# Patient Record
Sex: MALE | Race: WHITE
[De-identification: names, ages, dates, MRNs, and addresses within clinical notes are randomized per-mention and may not be internally consistent; named-entity substitution may affect disease eponyms.]

---

## 2018-03-23 ENCOUNTER — HOSPITAL ENCOUNTER (OUTPATIENT)
Age: 73
End: 2018-03-23
Payer: MEDICARE

## 2018-03-23 DIAGNOSIS — I10: Primary | ICD-10-CM

## 2018-03-23 DIAGNOSIS — E55.9: ICD-10-CM

## 2018-03-23 LAB
ALANINE AMINOTRANSFER ALT/SGPT: 20 U/L (ref 16–61)
ALBUMIN SERPL-MCNC: 3.1 G/DL (ref 3.2–5)
ALKALINE PHOSPHATASE: 73 U/L (ref 45–117)
ANION GAP: 7 (ref 5–15)
AST(SGOT): 24 U/L (ref 15–37)
BUN SERPL-MCNC: 19 MG/DL (ref 7–18)
BUN/CREAT RATIO: 18.4 RATIO (ref 10–20)
CALCIUM SERPL-MCNC: 8.4 MG/DL (ref 8.5–10.1)
CARBON DIOXIDE: 27 MMOL/L (ref 21–32)
CHLORIDE: 102 MMOL/L (ref 98–107)
DEPRECATED RDW RBC: 53.6 FL (ref 35.1–43.9)
DIFFERENTIAL INDICATED: (no result)
ERYTHROCYTE [DISTWIDTH] IN BLOOD: 15.2 % (ref 11.6–14.6)
EST GLOM FILT RATE - AFR AMER: 91 ML/MIN (ref 60–?)
GLOBULIN: 4.5 G/DL (ref 2.2–4.2)
GLUCOSE: 83 MG/DL (ref 74–106)
HCT VFR BLD AUTO: 43 % (ref 40–54)
HEMOGLOBIN: 13.2 G/DL (ref 13–16.5)
HGB BLD-MCNC: 13.2 G/DL (ref 13–16.5)
IMMATURE GRANULOCYTES COUNT: 0.04 X10^3/UL (ref 0–0)
MCV RBC: 96.6 FL (ref 80–94)
MEAN CORP HGB CONC: 30.7 G/GL (ref 32–36)
MEAN PLATELET VOL.: 11 FL (ref 6.2–12)
PLATELET # BLD: 175 K/MM3 (ref 150–450)
PLATELET COUNT: 175 K/MM3 (ref 150–450)
POSITIVE COUNT: NO
POSITIVE DIFFERENTIAL: NO
POSITIVE MORPHOLOGY: NO
POTASSIUM: 4.7 MMOL/L (ref 3.5–5.1)
RBC # BLD AUTO: 4.45 M/MM3 (ref 4.6–6.2)
RBC DISTRIBUTION WIDTH CV: 15.2 % (ref 11.6–14.6)
RBC DISTRIBUTION WIDTH SD: 53.6 FL (ref 35.1–43.9)
VITAMIN D,25 HYDROXY: 31.1 NG/ML (ref 29.95–100.01)
WBC # BLD AUTO: 9.6 K/MM3 (ref 4.4–11)
WHITE BLOOD COUNT: 9.6 K/MM3 (ref 4.4–11)

## 2018-03-23 PROCEDURE — 36415 COLL VENOUS BLD VENIPUNCTURE: CPT

## 2018-03-23 PROCEDURE — 85025 COMPLETE CBC W/AUTO DIFF WBC: CPT

## 2018-03-23 PROCEDURE — 82306 VITAMIN D 25 HYDROXY: CPT

## 2018-03-23 PROCEDURE — 84443 ASSAY THYROID STIM HORMONE: CPT

## 2018-03-23 PROCEDURE — 80053 COMPREHEN METABOLIC PANEL: CPT

## 2018-04-11 ENCOUNTER — HOSPITAL ENCOUNTER (INPATIENT)
Dept: HOSPITAL 100 - ED | Age: 73
LOS: 3 days | Discharge: NURSING, REHAB OR LONG-TERM CARE | DRG: 190 | End: 2018-04-14
Payer: MEDICARE

## 2018-04-11 VITALS
OXYGEN SATURATION: 94 % | HEART RATE: 61 BPM | DIASTOLIC BLOOD PRESSURE: 69 MMHG | SYSTOLIC BLOOD PRESSURE: 137 MMHG | RESPIRATION RATE: 20 BRPM

## 2018-04-11 VITALS
OXYGEN SATURATION: 94 % | DIASTOLIC BLOOD PRESSURE: 69 MMHG | HEART RATE: 61 BPM | SYSTOLIC BLOOD PRESSURE: 137 MMHG | RESPIRATION RATE: 20 BRPM

## 2018-04-11 VITALS — HEART RATE: 73 BPM | OXYGEN SATURATION: 93 % | RESPIRATION RATE: 26 BRPM

## 2018-04-11 VITALS
SYSTOLIC BLOOD PRESSURE: 134 MMHG | HEART RATE: 74 BPM | TEMPERATURE: 100.2 F | RESPIRATION RATE: 20 BRPM | DIASTOLIC BLOOD PRESSURE: 84 MMHG | OXYGEN SATURATION: 86 %

## 2018-04-11 VITALS — BODY MASS INDEX: 40.1 KG/M2 | BODY MASS INDEX: 38.5 KG/M2 | BODY MASS INDEX: 40 KG/M2

## 2018-04-11 VITALS
DIASTOLIC BLOOD PRESSURE: 62 MMHG | TEMPERATURE: 98.78 F | HEART RATE: 64 BPM | SYSTOLIC BLOOD PRESSURE: 129 MMHG | RESPIRATION RATE: 18 BRPM | OXYGEN SATURATION: 94 %

## 2018-04-11 VITALS — RESPIRATION RATE: 22 BRPM | HEART RATE: 78 BPM | OXYGEN SATURATION: 90 %

## 2018-04-11 VITALS — HEART RATE: 78 BPM | DIASTOLIC BLOOD PRESSURE: 57 MMHG | SYSTOLIC BLOOD PRESSURE: 123 MMHG

## 2018-04-11 VITALS — OXYGEN SATURATION: 93 %

## 2018-04-11 DIAGNOSIS — I42.9: ICD-10-CM

## 2018-04-11 DIAGNOSIS — E78.5: ICD-10-CM

## 2018-04-11 DIAGNOSIS — F17.200: ICD-10-CM

## 2018-04-11 DIAGNOSIS — E66.01: ICD-10-CM

## 2018-04-11 DIAGNOSIS — I10: ICD-10-CM

## 2018-04-11 DIAGNOSIS — C18.9: ICD-10-CM

## 2018-04-11 DIAGNOSIS — K21.9: ICD-10-CM

## 2018-04-11 DIAGNOSIS — J44.1: Primary | ICD-10-CM

## 2018-04-11 DIAGNOSIS — J20.9: ICD-10-CM

## 2018-04-11 DIAGNOSIS — I48.0: ICD-10-CM

## 2018-04-11 DIAGNOSIS — I25.10: ICD-10-CM

## 2018-04-11 DIAGNOSIS — J96.01: ICD-10-CM

## 2018-04-11 DIAGNOSIS — J44.0: ICD-10-CM

## 2018-04-11 DIAGNOSIS — G47.33: ICD-10-CM

## 2018-04-11 DIAGNOSIS — Z99.81: ICD-10-CM

## 2018-04-11 LAB
ANION GAP: 8 (ref 5–15)
BUN SERPL-MCNC: 19 MG/DL (ref 7–18)
BUN/CREAT RATIO: 18.8 RATIO (ref 10–20)
CALCIUM SERPL-MCNC: 8.6 MG/DL (ref 8.5–10.1)
CARBON DIOXIDE: 30 MMOL/L (ref 21–32)
CHLORIDE: 93 MMOL/L (ref 98–107)
DEPRECATED RDW RBC: 52.1 FL (ref 35.1–43.9)
DIFFERENTIAL INDICATED: (no result)
ERYTHROCYTE [DISTWIDTH] IN BLOOD: 15.3 % (ref 11.6–14.6)
EST GLOM FILT RATE - AFR AMER: 94 ML/MIN (ref 60–?)
ESTIMATED CREATININE CLEARANCE: 78 ML/MIN
GLUCOSE: 91 MG/DL (ref 74–106)
HCT VFR BLD AUTO: 40.7 % (ref 40–54)
HEMOGLOBIN: 13.1 G/DL (ref 13–16.5)
HGB BLD-MCNC: 13.1 G/DL (ref 13–16.5)
IMMATURE GRANULOCYTES COUNT: 0.01 X10^3/UL (ref 0–0)
MCV RBC: 91.9 FL (ref 80–94)
MEAN CORP HGB CONC: 32.2 G/GL (ref 32–36)
MEAN PLATELET VOL.: 10.4 FL (ref 6.2–12)
PLATELET # BLD: 123 K/MM3 (ref 150–450)
PLATELET COUNT: 123 K/MM3 (ref 150–450)
POSITIVE COUNT: NO
POSITIVE DIFFERENTIAL: NO
POSITIVE MORPHOLOGY: NO
POTASSIUM: 3.9 MMOL/L (ref 3.5–5.1)
PROTHROMBIN TIME (PROTIME)PT.: 16.9 SECONDS (ref 11.7–14.9)
RBC # BLD AUTO: 4.43 M/MM3 (ref 4.6–6.2)
RBC DISTRIBUTION WIDTH CV: 15.3 % (ref 11.6–14.6)
RBC DISTRIBUTION WIDTH SD: 52.1 FL (ref 35.1–43.9)
WBC # BLD AUTO: 6.8 K/MM3 (ref 4.4–11)
WHITE BLOOD COUNT: 6.8 K/MM3 (ref 4.4–11)

## 2018-04-11 PROCEDURE — 36415 COLL VENOUS BLD VENIPUNCTURE: CPT

## 2018-04-11 PROCEDURE — 87804 INFLUENZA ASSAY W/OPTIC: CPT

## 2018-04-11 PROCEDURE — 87040 BLOOD CULTURE FOR BACTERIA: CPT

## 2018-04-11 PROCEDURE — 85025 COMPLETE CBC W/AUTO DIFF WBC: CPT

## 2018-04-11 PROCEDURE — 97162 PT EVAL MOD COMPLEX 30 MIN: CPT

## 2018-04-11 PROCEDURE — 71045 X-RAY EXAM CHEST 1 VIEW: CPT

## 2018-04-11 PROCEDURE — 87070 CULTURE OTHR SPECIMN AEROBIC: CPT

## 2018-04-11 PROCEDURE — 87449 NOS EACH ORGANISM AG IA: CPT

## 2018-04-11 PROCEDURE — 99285 EMERGENCY DEPT VISIT HI MDM: CPT

## 2018-04-11 PROCEDURE — 83605 ASSAY OF LACTIC ACID: CPT

## 2018-04-11 PROCEDURE — A4216 STERILE WATER/SALINE, 10 ML: HCPCS

## 2018-04-11 PROCEDURE — 80048 BASIC METABOLIC PNL TOTAL CA: CPT

## 2018-04-11 PROCEDURE — 80162 ASSAY OF DIGOXIN TOTAL: CPT

## 2018-04-11 PROCEDURE — 85027 COMPLETE CBC AUTOMATED: CPT

## 2018-04-11 PROCEDURE — 83735 ASSAY OF MAGNESIUM: CPT

## 2018-04-11 PROCEDURE — 85610 PROTHROMBIN TIME: CPT

## 2018-04-11 PROCEDURE — 97530 THERAPEUTIC ACTIVITIES: CPT

## 2018-04-11 PROCEDURE — 97802 MEDICAL NUTRITION INDIV IN: CPT

## 2018-04-11 PROCEDURE — 94640 AIRWAY INHALATION TREATMENT: CPT

## 2018-04-11 PROCEDURE — 84484 ASSAY OF TROPONIN QUANT: CPT

## 2018-04-11 PROCEDURE — 97166 OT EVAL MOD COMPLEX 45 MIN: CPT

## 2018-04-11 PROCEDURE — 87205 SMEAR GRAM STAIN: CPT

## 2018-04-11 PROCEDURE — 87077 CULTURE AEROBIC IDENTIFY: CPT

## 2018-04-11 PROCEDURE — 93005 ELECTROCARDIOGRAM TRACING: CPT

## 2018-04-11 RX ADMIN — SODIUM CHLORIDE 150 ML: 9 INJECTION, SOLUTION INTRAVENOUS at 16:16

## 2018-04-12 VITALS
HEART RATE: 60 BPM | SYSTOLIC BLOOD PRESSURE: 107 MMHG | RESPIRATION RATE: 18 BRPM | TEMPERATURE: 97.52 F | OXYGEN SATURATION: 93 % | DIASTOLIC BLOOD PRESSURE: 61 MMHG

## 2018-04-12 VITALS — RESPIRATION RATE: 20 BRPM | HEART RATE: 80 BPM

## 2018-04-12 VITALS
RESPIRATION RATE: 20 BRPM | TEMPERATURE: 97.88 F | HEART RATE: 53 BPM | OXYGEN SATURATION: 94 % | SYSTOLIC BLOOD PRESSURE: 102 MMHG | DIASTOLIC BLOOD PRESSURE: 56 MMHG

## 2018-04-12 VITALS
HEART RATE: 53 BPM | OXYGEN SATURATION: 94 % | RESPIRATION RATE: 18 BRPM | DIASTOLIC BLOOD PRESSURE: 64 MMHG | TEMPERATURE: 98.24 F | SYSTOLIC BLOOD PRESSURE: 122 MMHG

## 2018-04-12 VITALS
TEMPERATURE: 98.2 F | SYSTOLIC BLOOD PRESSURE: 103 MMHG | OXYGEN SATURATION: 94 % | HEART RATE: 64 BPM | RESPIRATION RATE: 16 BRPM | DIASTOLIC BLOOD PRESSURE: 70 MMHG

## 2018-04-12 VITALS — HEART RATE: 57 BPM | RESPIRATION RATE: 17 BRPM | OXYGEN SATURATION: 91 %

## 2018-04-12 VITALS — RESPIRATION RATE: 18 BRPM | OXYGEN SATURATION: 92 % | HEART RATE: 55 BPM

## 2018-04-12 VITALS — RESPIRATION RATE: 22 BRPM | HEART RATE: 80 BPM

## 2018-04-12 VITALS — HEART RATE: 60 BPM

## 2018-04-12 VITALS — OXYGEN SATURATION: 93 %

## 2018-04-12 VITALS — RESPIRATION RATE: 18 BRPM | HEART RATE: 82 BPM

## 2018-04-12 VITALS — HEART RATE: 64 BPM

## 2018-04-12 LAB
ANION GAP: 7 (ref 5–15)
BUN SERPL-MCNC: 18 MG/DL (ref 7–18)
BUN/CREAT RATIO: 19.2 RATIO (ref 10–20)
CALCIUM SERPL-MCNC: 8.5 MG/DL (ref 8.5–10.1)
CARBON DIOXIDE: 29 MMOL/L (ref 21–32)
CHLORIDE: 97 MMOL/L (ref 98–107)
DEPRECATED RDW RBC: 52.5 FL (ref 35.1–43.9)
DIFFERENTIAL INDICATED: (no result)
ERYTHROCYTE [DISTWIDTH] IN BLOOD: 15.7 % (ref 11.6–14.6)
EST GLOM FILT RATE - AFR AMER: 102 ML/MIN (ref 60–?)
ESTIMATED CREATININE CLEARANCE: 83.8 ML/MIN
GLUCOSE: 117 MG/DL (ref 74–106)
HCT VFR BLD AUTO: 41 % (ref 40–54)
HEMOGLOBIN: 13.1 G/DL (ref 13–16.5)
HGB BLD-MCNC: 13.1 G/DL (ref 13–16.5)
IMMATURE GRANULOCYTES COUNT: 0.03 X10^3/UL (ref 0–0)
MCV RBC: 93.4 FL (ref 80–94)
MEAN CORP HGB CONC: 32 G/GL (ref 32–36)
MEAN PLATELET VOL.: 10.6 FL (ref 6.2–12)
PLATELET # BLD: 121 K/MM3 (ref 150–450)
PLATELET COUNT: 121 K/MM3 (ref 150–450)
POSITIVE COUNT: NO
POSITIVE DIFFERENTIAL: NO
POSITIVE MORPHOLOGY: NO
POTASSIUM: 4.3 MMOL/L (ref 3.5–5.1)
PROTHROMBIN TIME (PROTIME)PT.: 16.5 SECONDS (ref 11.7–14.9)
RBC # BLD AUTO: 4.39 M/MM3 (ref 4.6–6.2)
RBC DISTRIBUTION WIDTH CV: 15.7 % (ref 11.6–14.6)
RBC DISTRIBUTION WIDTH SD: 52.5 FL (ref 35.1–43.9)
WBC # BLD AUTO: 5.6 K/MM3 (ref 4.4–11)
WHITE BLOOD COUNT: 5.6 K/MM3 (ref 4.4–11)

## 2018-04-12 RX ADMIN — SODIUM CHLORIDE, PRESERVATIVE FREE 0 ML: 5 INJECTION INTRAVENOUS at 14:16

## 2018-04-12 RX ADMIN — SODIUM CHLORIDE, PRESERVATIVE FREE 0 ML: 5 INJECTION INTRAVENOUS at 21:49

## 2018-04-13 VITALS
OXYGEN SATURATION: 92 % | HEART RATE: 65 BPM | TEMPERATURE: 97.4 F | RESPIRATION RATE: 20 BRPM | DIASTOLIC BLOOD PRESSURE: 67 MMHG | SYSTOLIC BLOOD PRESSURE: 121 MMHG

## 2018-04-13 VITALS
HEART RATE: 57 BPM | SYSTOLIC BLOOD PRESSURE: 125 MMHG | OXYGEN SATURATION: 93 % | TEMPERATURE: 97.8 F | RESPIRATION RATE: 16 BRPM | DIASTOLIC BLOOD PRESSURE: 60 MMHG

## 2018-04-13 VITALS — OXYGEN SATURATION: 91 % | RESPIRATION RATE: 18 BRPM | HEART RATE: 48 BPM

## 2018-04-13 VITALS
SYSTOLIC BLOOD PRESSURE: 124 MMHG | TEMPERATURE: 97.4 F | DIASTOLIC BLOOD PRESSURE: 63 MMHG | OXYGEN SATURATION: 93 % | HEART RATE: 57 BPM | RESPIRATION RATE: 18 BRPM

## 2018-04-13 VITALS — HEART RATE: 52 BPM | RESPIRATION RATE: 18 BRPM

## 2018-04-13 VITALS — HEART RATE: 57 BPM

## 2018-04-13 VITALS — HEART RATE: 56 BPM | RESPIRATION RATE: 18 BRPM

## 2018-04-13 VITALS — HEART RATE: 51 BPM | RESPIRATION RATE: 18 BRPM

## 2018-04-13 VITALS — HEART RATE: 65 BPM

## 2018-04-13 VITALS — RESPIRATION RATE: 18 BRPM | HEART RATE: 50 BPM

## 2018-04-13 VITALS
HEART RATE: 57 BPM | RESPIRATION RATE: 14 BRPM | TEMPERATURE: 97.3 F | SYSTOLIC BLOOD PRESSURE: 126 MMHG | DIASTOLIC BLOOD PRESSURE: 74 MMHG | OXYGEN SATURATION: 94 %

## 2018-04-13 LAB
ANION GAP: 10 (ref 5–15)
BUN SERPL-MCNC: 24 MG/DL (ref 7–18)
BUN/CREAT RATIO: 18.8 RATIO (ref 10–20)
CALCIUM SERPL-MCNC: 9 MG/DL (ref 8.5–10.1)
CARBON DIOXIDE: 26 MMOL/L (ref 21–32)
CHLORIDE: 98 MMOL/L (ref 98–107)
DEPRECATED RDW RBC: 50.8 FL (ref 35.1–43.9)
ERYTHROCYTE [DISTWIDTH] IN BLOOD: 15.3 % (ref 11.6–14.6)
EST GLOM FILT RATE - AFR AMER: 71 ML/MIN (ref 60–?)
ESTIMATED CREATININE CLEARANCE: 61.54 ML/MIN
GLUCOSE: 157 MG/DL (ref 74–106)
HCT VFR BLD AUTO: 41.9 % (ref 40–54)
HEMOGLOBIN: 13.7 G/DL (ref 13–16.5)
HGB BLD-MCNC: 13.7 G/DL (ref 13–16.5)
MAGNESIUM: 2 MG/DL (ref 1.6–2.6)
MCV RBC: 92.3 FL (ref 80–94)
MEAN CORP HGB CONC: 32.7 G/GL (ref 32–36)
MEAN PLATELET VOL.: 10.6 FL (ref 6.2–12)
PLATELET # BLD: 150 K/MM3 (ref 150–450)
PLATELET COUNT: 150 K/MM3 (ref 150–450)
POTASSIUM: 3.9 MMOL/L (ref 3.5–5.1)
PROTHROMBIN TIME (PROTIME)PT.: 16.8 SECONDS (ref 11.7–14.9)
RBC # BLD AUTO: 4.54 M/MM3 (ref 4.6–6.2)
RBC DISTRIBUTION WIDTH CV: 15.3 % (ref 11.6–14.6)
RBC DISTRIBUTION WIDTH SD: 50.8 FL (ref 35.1–43.9)
SCAN INDICATED ON CBC? Y/N: NO
WBC # BLD AUTO: 6.7 K/MM3 (ref 4.4–11)
WHITE BLOOD COUNT: 6.7 K/MM3 (ref 4.4–11)

## 2018-04-13 RX ADMIN — SODIUM CHLORIDE, PRESERVATIVE FREE 0 ML: 5 INJECTION INTRAVENOUS at 22:02

## 2018-04-13 RX ADMIN — SODIUM CHLORIDE, PRESERVATIVE FREE 0 ML: 5 INJECTION INTRAVENOUS at 14:27

## 2018-04-14 VITALS
DIASTOLIC BLOOD PRESSURE: 55 MMHG | HEART RATE: 55 BPM | SYSTOLIC BLOOD PRESSURE: 139 MMHG | RESPIRATION RATE: 18 BRPM | TEMPERATURE: 97.6 F | OXYGEN SATURATION: 94 %

## 2018-04-14 VITALS
RESPIRATION RATE: 18 BRPM | TEMPERATURE: 97.88 F | OXYGEN SATURATION: 92 % | DIASTOLIC BLOOD PRESSURE: 64 MMHG | HEART RATE: 79 BPM | SYSTOLIC BLOOD PRESSURE: 128 MMHG

## 2018-04-14 VITALS — HEART RATE: 68 BPM | OXYGEN SATURATION: 92 % | RESPIRATION RATE: 18 BRPM

## 2018-04-14 VITALS — RESPIRATION RATE: 14 BRPM | HEART RATE: 50 BPM

## 2018-04-14 VITALS — HEART RATE: 74 BPM | RESPIRATION RATE: 18 BRPM

## 2018-04-14 VITALS — HEART RATE: 79 BPM

## 2018-04-14 LAB
ANION GAP: 5 (ref 5–15)
BUN SERPL-MCNC: 25 MG/DL (ref 7–18)
BUN/CREAT RATIO: 24 RATIO (ref 10–20)
CALCIUM SERPL-MCNC: 8.8 MG/DL (ref 8.5–10.1)
CARBON DIOXIDE: 30 MMOL/L (ref 21–32)
CHLORIDE: 101 MMOL/L (ref 98–107)
DEPRECATED RDW RBC: 50.9 FL (ref 35.1–43.9)
ERYTHROCYTE [DISTWIDTH] IN BLOOD: 15.3 % (ref 11.6–14.6)
EST GLOM FILT RATE - AFR AMER: 90 ML/MIN (ref 60–?)
ESTIMATED CREATININE CLEARANCE: 75.75 ML/MIN
GLUCOSE: 126 MG/DL (ref 74–106)
HCT VFR BLD AUTO: 40.9 % (ref 40–54)
HEMOGLOBIN: 13.1 G/DL (ref 13–16.5)
HGB BLD-MCNC: 13.1 G/DL (ref 13–16.5)
MAGNESIUM: 2.2 MG/DL (ref 1.6–2.6)
MCV RBC: 92.7 FL (ref 80–94)
MEAN CORP HGB CONC: 32 G/GL (ref 32–36)
MEAN PLATELET VOL.: 10.8 FL (ref 6.2–12)
PLATELET # BLD: 152 K/MM3 (ref 150–450)
PLATELET COUNT: 152 K/MM3 (ref 150–450)
POTASSIUM: 4.2 MMOL/L (ref 3.5–5.1)
PROTHROMBIN TIME (PROTIME)PT.: 17.8 SECONDS (ref 11.7–14.9)
RBC # BLD AUTO: 4.41 M/MM3 (ref 4.6–6.2)
RBC DISTRIBUTION WIDTH CV: 15.3 % (ref 11.6–14.6)
RBC DISTRIBUTION WIDTH SD: 50.9 FL (ref 35.1–43.9)
SCAN INDICATED ON CBC? Y/N: NO
WBC # BLD AUTO: 8.2 K/MM3 (ref 4.4–11)
WHITE BLOOD COUNT: 8.2 K/MM3 (ref 4.4–11)

## 2018-04-14 RX ADMIN — SODIUM CHLORIDE, PRESERVATIVE FREE 0 ML: 5 INJECTION INTRAVENOUS at 05:48

## 2018-07-23 ENCOUNTER — HOSPITAL ENCOUNTER (OUTPATIENT)
Age: 73
End: 2018-07-23
Payer: MEDICARE

## 2018-07-23 DIAGNOSIS — E55.9: ICD-10-CM

## 2018-07-23 DIAGNOSIS — Z13.89: ICD-10-CM

## 2018-07-23 DIAGNOSIS — I10: Primary | ICD-10-CM

## 2018-07-23 LAB
ALANINE AMINOTRANSFER ALT/SGPT: 22 U/L (ref 16–61)
ALBUMIN SERPL-MCNC: 3.6 G/DL (ref 3.2–5)
ALKALINE PHOSPHATASE: 66 U/L (ref 45–117)
ANION GAP: 9 (ref 5–15)
AST(SGOT): 16 U/L (ref 15–37)
BUN SERPL-MCNC: 21 MG/DL (ref 7–18)
BUN/CREAT RATIO: 16.3 RATIO (ref 10–20)
CALCIUM SERPL-MCNC: 9.6 MG/DL (ref 8.5–10.1)
CARBON DIOXIDE: 29 MMOL/L (ref 21–32)
CHLORIDE: 100 MMOL/L (ref 98–107)
DEPRECATED RDW RBC: 53.7 FL (ref 35.1–43.9)
DIFFERENTIAL INDICATED: (no result)
ERYTHROCYTE [DISTWIDTH] IN BLOOD: 16.3 % (ref 11.6–14.6)
EST GLOM FILT RATE - AFR AMER: 70 ML/MIN (ref 60–?)
GLOBULIN: 3.9 G/DL (ref 2.2–4.2)
GLUCOSE: 96 MG/DL (ref 74–106)
HCT VFR BLD AUTO: 39.9 % (ref 40–54)
HEMOGLOBIN: 12.9 G/DL (ref 13–16.5)
HGB BLD-MCNC: 12.9 G/DL (ref 13–16.5)
IMMATURE GRANULOCYTES COUNT: 0.04 X10^3/UL (ref 0–0)
MCV RBC: 93.2 FL (ref 80–94)
MEAN CORP HGB CONC: 32.3 G/GL (ref 32–36)
MEAN PLATELET VOL.: 10.8 FL (ref 6.2–12)
PLATELET # BLD: 184 K/MM3 (ref 150–450)
PLATELET COUNT: 184 K/MM3 (ref 150–450)
POSITIVE COUNT: NO
POSITIVE DIFFERENTIAL: NO
POSITIVE MORPHOLOGY: NO
POTASSIUM: 4.2 MMOL/L (ref 3.5–5.1)
RBC # BLD AUTO: 4.28 M/MM3 (ref 4.6–6.2)
RBC DISTRIBUTION WIDTH CV: 16.3 % (ref 11.6–14.6)
RBC DISTRIBUTION WIDTH SD: 53.7 FL (ref 35.1–43.9)
VITAMIN D,25 HYDROXY: 33.1 NG/ML (ref 29.95–100.01)
WBC # BLD AUTO: 9.3 K/MM3 (ref 4.4–11)
WHITE BLOOD COUNT: 9.3 K/MM3 (ref 4.4–11)

## 2018-07-23 PROCEDURE — 36415 COLL VENOUS BLD VENIPUNCTURE: CPT

## 2018-07-23 PROCEDURE — 84443 ASSAY THYROID STIM HORMONE: CPT

## 2018-07-23 PROCEDURE — 80053 COMPREHEN METABOLIC PANEL: CPT

## 2018-07-23 PROCEDURE — 85025 COMPLETE CBC W/AUTO DIFF WBC: CPT

## 2018-07-23 PROCEDURE — 86803 HEPATITIS C AB TEST: CPT

## 2018-07-23 PROCEDURE — 82306 VITAMIN D 25 HYDROXY: CPT

## 2018-07-25 LAB — HEP C ANTIBODIES: <0.1 S/CO RATIO (ref 0–0.9)

## 2018-10-16 ENCOUNTER — HOSPITAL ENCOUNTER (EMERGENCY)
Age: 73
Discharge: HOME | End: 2018-10-16
Payer: MEDICARE

## 2018-10-16 VITALS
DIASTOLIC BLOOD PRESSURE: 67 MMHG | SYSTOLIC BLOOD PRESSURE: 143 MMHG | RESPIRATION RATE: 18 BRPM | OXYGEN SATURATION: 99 % | HEART RATE: 53 BPM

## 2018-10-16 VITALS — BODY MASS INDEX: 43 KG/M2

## 2018-10-16 VITALS
SYSTOLIC BLOOD PRESSURE: 132 MMHG | HEART RATE: 44 BPM | OXYGEN SATURATION: 96 % | TEMPERATURE: 97.88 F | RESPIRATION RATE: 18 BRPM | DIASTOLIC BLOOD PRESSURE: 81 MMHG

## 2018-10-16 DIAGNOSIS — J14: ICD-10-CM

## 2018-10-16 DIAGNOSIS — I10: ICD-10-CM

## 2018-10-16 DIAGNOSIS — D68.9: ICD-10-CM

## 2018-10-16 DIAGNOSIS — F17.210: ICD-10-CM

## 2018-10-16 DIAGNOSIS — I48.91: ICD-10-CM

## 2018-10-16 DIAGNOSIS — I87.8: ICD-10-CM

## 2018-10-16 DIAGNOSIS — E78.00: ICD-10-CM

## 2018-10-16 DIAGNOSIS — Z85.038: ICD-10-CM

## 2018-10-16 DIAGNOSIS — J44.9: ICD-10-CM

## 2018-10-16 DIAGNOSIS — M79.661: Primary | ICD-10-CM

## 2018-10-16 DIAGNOSIS — Z85.46: ICD-10-CM

## 2018-10-16 LAB — PROTHROMBIN TIME (PROTIME)PT.: 25.5 SECONDS (ref 11.7–14.9)

## 2018-10-16 PROCEDURE — 85610 PROTHROMBIN TIME: CPT

## 2018-10-16 PROCEDURE — 93971 EXTREMITY STUDY: CPT

## 2018-10-16 PROCEDURE — 36415 COLL VENOUS BLD VENIPUNCTURE: CPT

## 2018-10-16 PROCEDURE — 99284 EMERGENCY DEPT VISIT MOD MDM: CPT

## 2019-01-23 ENCOUNTER — HOSPITAL ENCOUNTER (OUTPATIENT)
Age: 74
End: 2019-01-23
Payer: MEDICARE

## 2019-01-23 VITALS — BODY MASS INDEX: 43 KG/M2

## 2019-01-23 DIAGNOSIS — I10: ICD-10-CM

## 2019-01-23 DIAGNOSIS — E55.9: Primary | ICD-10-CM

## 2019-01-23 LAB
ALANINE AMINOTRANSFER ALT/SGPT: 23 U/L (ref 16–61)
ALBUMIN SERPL-MCNC: 3.6 G/DL (ref 3.2–5)
ALKALINE PHOSPHATASE: 72 U/L (ref 45–117)
ANION GAP: 7 (ref 5–15)
AST(SGOT): 18 U/L (ref 15–37)
BUN SERPL-MCNC: 20 MG/DL (ref 7–18)
BUN/CREAT RATIO: 16.3 RATIO (ref 10–20)
CALCIUM SERPL-MCNC: 9.4 MG/DL (ref 8.5–10.1)
CARBON DIOXIDE: 29 MMOL/L (ref 21–32)
CHLORIDE: 102 MMOL/L (ref 98–107)
DEPRECATED RDW RBC: 56.3 FL (ref 35.1–43.9)
DIFFERENTIAL INDICATED: (no result)
ERYTHROCYTE [DISTWIDTH] IN BLOOD: 17 % (ref 11.6–14.6)
EST GLOM FILT RATE - AFR AMER: 74 ML/MIN (ref 60–?)
GLOBULIN: 3.9 G/DL (ref 2.2–4.2)
GLUCOSE: 87 MG/DL (ref 74–106)
HCT VFR BLD AUTO: 39.4 % (ref 40–54)
HEMOGLOBIN: 12.4 G/DL (ref 13–16.5)
HGB BLD-MCNC: 12.4 G/DL (ref 13–16.5)
IMMATURE GRANULOCYTES COUNT: 0.04 X10^3/UL (ref 0–0)
MCV RBC: 92.1 FL (ref 80–94)
MEAN CORP HGB CONC: 31.5 G/GL (ref 32–36)
MEAN PLATELET VOL.: 11.7 FL (ref 6.2–12)
PLATELET # BLD: 179 K/MM3 (ref 150–450)
PLATELET COUNT: 179 K/MM3 (ref 150–450)
POSITIVE COUNT: NO
POSITIVE DIFFERENTIAL: NO
POSITIVE MORPHOLOGY: NO
POTASSIUM: 4.3 MMOL/L (ref 3.5–5.1)
RBC # BLD AUTO: 4.28 M/MM3 (ref 4.6–6.2)
RBC DISTRIBUTION WIDTH CV: 17 % (ref 11.6–14.6)
RBC DISTRIBUTION WIDTH SD: 56.3 FL (ref 35.1–43.9)
VITAMIN D,25 HYDROXY: 38.5 NG/ML (ref 29.95–100.01)
WBC # BLD AUTO: 8.6 K/MM3 (ref 4.4–11)
WHITE BLOOD COUNT: 8.6 K/MM3 (ref 4.4–11)

## 2019-01-23 PROCEDURE — 80053 COMPREHEN METABOLIC PANEL: CPT

## 2019-01-23 PROCEDURE — 36415 COLL VENOUS BLD VENIPUNCTURE: CPT

## 2019-01-23 PROCEDURE — 82306 VITAMIN D 25 HYDROXY: CPT

## 2019-01-23 PROCEDURE — 85025 COMPLETE CBC W/AUTO DIFF WBC: CPT

## 2019-01-23 PROCEDURE — 84443 ASSAY THYROID STIM HORMONE: CPT

## 2019-03-10 ENCOUNTER — HOSPITAL ENCOUNTER (EMERGENCY)
Age: 74
Discharge: HOME | End: 2019-03-10
Payer: MEDICARE

## 2019-03-10 VITALS
RESPIRATION RATE: 17 BRPM | HEART RATE: 68 BPM | OXYGEN SATURATION: 99 % | SYSTOLIC BLOOD PRESSURE: 120 MMHG | DIASTOLIC BLOOD PRESSURE: 84 MMHG

## 2019-03-10 VITALS
SYSTOLIC BLOOD PRESSURE: 124 MMHG | RESPIRATION RATE: 18 BRPM | DIASTOLIC BLOOD PRESSURE: 61 MMHG | OXYGEN SATURATION: 96 % | TEMPERATURE: 98.24 F | HEART RATE: 68 BPM

## 2019-03-10 VITALS — TEMPERATURE: 98.96 F

## 2019-03-10 VITALS — BODY MASS INDEX: 39.1 KG/M2

## 2019-03-10 DIAGNOSIS — L03.312: Primary | ICD-10-CM

## 2019-03-10 PROCEDURE — 10060 I&D ABSCESS SIMPLE/SINGLE: CPT

## 2019-03-10 PROCEDURE — 99282 EMERGENCY DEPT VISIT SF MDM: CPT

## 2019-08-06 ENCOUNTER — HOSPITAL ENCOUNTER (OUTPATIENT)
Age: 74
End: 2019-08-06
Payer: MEDICARE

## 2019-08-06 VITALS — BODY MASS INDEX: 39.1 KG/M2

## 2019-08-06 DIAGNOSIS — E55.9: ICD-10-CM

## 2019-08-06 DIAGNOSIS — I10: Primary | ICD-10-CM

## 2019-08-06 LAB
ALANINE AMINOTRANSFER ALT/SGPT: 23 U/L (ref 16–61)
ALBUMIN SERPL-MCNC: 3.5 G/DL (ref 3.2–5)
ALKALINE PHOSPHATASE: 78 U/L (ref 45–117)
ANION GAP: 6 (ref 5–15)
AST(SGOT): 17 U/L (ref 15–37)
BUN SERPL-MCNC: 25 MG/DL (ref 7–18)
BUN/CREAT RATIO: 18.4 RATIO (ref 10–20)
CALCIUM SERPL-MCNC: 9.1 MG/DL (ref 8.5–10.1)
CARBON DIOXIDE: 29 MMOL/L (ref 21–32)
CHLORIDE: 103 MMOL/L (ref 98–107)
DEPRECATED RDW RBC: 53.3 FL (ref 35.1–43.9)
ERYTHROCYTE [DISTWIDTH] IN BLOOD: 15.6 % (ref 11.6–14.6)
EST GLOM FILT RATE - AFR AMER: 66 ML/MIN (ref 60–?)
GLOBULIN: 4.1 G/DL (ref 2.2–4.2)
GLUCOSE: 100 MG/DL (ref 74–106)
HCT VFR BLD AUTO: 41.1 % (ref 40–54)
HEMOGLOBIN: 12.9 G/DL (ref 13–16.5)
HGB BLD-MCNC: 12.9 G/DL (ref 13–16.5)
IMMATURE GRANULOCYTES COUNT: 0.05 X10^3/UL (ref 0–0)
MCV RBC: 93 FL (ref 80–94)
MEAN CORP HGB CONC: 31.4 G/DL (ref 32–36)
MEAN PLATELET VOL.: 10.9 FL (ref 6.2–12)
NRBC FLAGGED BY ANALYZER: 0 % (ref 0–5)
PLATELET # BLD: 165 K/MM3 (ref 150–450)
PLATELET COUNT: 165 K/MM3 (ref 150–450)
POTASSIUM: 3.7 MMOL/L (ref 3.5–5.1)
RBC # BLD AUTO: 4.42 M/MM3 (ref 4.6–6.2)
RBC DISTRIBUTION WIDTH CV: 15.6 % (ref 11.6–14.6)
RBC DISTRIBUTION WIDTH SD: 53.3 FL (ref 35.1–43.9)
VITAMIN D,25 HYDROXY: 16.4 NG/ML (ref 29.95–100.01)
WBC # BLD AUTO: 7.8 K/MM3 (ref 4.4–11)
WHITE BLOOD COUNT: 7.8 K/MM3 (ref 4.4–11)

## 2019-08-06 PROCEDURE — 84443 ASSAY THYROID STIM HORMONE: CPT

## 2019-08-06 PROCEDURE — 82306 VITAMIN D 25 HYDROXY: CPT

## 2019-08-06 PROCEDURE — 85025 COMPLETE CBC W/AUTO DIFF WBC: CPT

## 2019-08-06 PROCEDURE — 80053 COMPREHEN METABOLIC PANEL: CPT

## 2019-08-06 PROCEDURE — 36415 COLL VENOUS BLD VENIPUNCTURE: CPT

## 2019-09-24 ENCOUNTER — HOSPITAL ENCOUNTER (OUTPATIENT)
Age: 74
End: 2019-09-24
Payer: MEDICARE

## 2019-09-24 VITALS — BODY MASS INDEX: 41.1 KG/M2

## 2019-09-24 DIAGNOSIS — Z95.810: ICD-10-CM

## 2019-09-24 DIAGNOSIS — I25.10: ICD-10-CM

## 2019-09-24 DIAGNOSIS — I47.2: ICD-10-CM

## 2019-09-24 DIAGNOSIS — I42.0: Primary | ICD-10-CM

## 2019-09-24 LAB
ANION GAP: 7 (ref 5–15)
BUN SERPL-MCNC: 22 MG/DL (ref 7–18)
BUN/CREAT RATIO: 19.5 RATIO (ref 10–20)
CALCIUM SERPL-MCNC: 8.9 MG/DL (ref 8.5–10.1)
CARBON DIOXIDE: 27 MMOL/L (ref 21–32)
CHLORIDE: 103 MMOL/L (ref 98–107)
EST GLOM FILT RATE - AFR AMER: 82 ML/MIN (ref 60–?)
GLUCOSE: 79 MG/DL (ref 74–106)
POTASSIUM: 4.4 MMOL/L (ref 3.5–5.1)

## 2019-09-24 PROCEDURE — A4216 STERILE WATER/SALINE, 10 ML: HCPCS

## 2019-09-24 PROCEDURE — 80048 BASIC METABOLIC PNL TOTAL CA: CPT

## 2019-09-24 PROCEDURE — 36415 COLL VENOUS BLD VENIPUNCTURE: CPT

## 2019-09-24 PROCEDURE — C8929 TTE W OR WO FOL WCON,DOPPLER: HCPCS

## 2019-09-24 PROCEDURE — 93306 TTE W/DOPPLER COMPLETE: CPT

## 2019-09-30 ENCOUNTER — HOSPITAL ENCOUNTER (OUTPATIENT)
Age: 74
End: 2019-09-30
Payer: MEDICARE

## 2019-09-30 VITALS — BODY MASS INDEX: 41.1 KG/M2

## 2019-09-30 DIAGNOSIS — I25.10: ICD-10-CM

## 2019-09-30 DIAGNOSIS — I47.2: ICD-10-CM

## 2019-09-30 DIAGNOSIS — I42.0: Primary | ICD-10-CM

## 2019-09-30 DIAGNOSIS — Z95.810: ICD-10-CM

## 2019-09-30 PROCEDURE — C8928 TTE W OR W/O FOL W/CON,STRES: HCPCS

## 2019-09-30 PROCEDURE — A4216 STERILE WATER/SALINE, 10 ML: HCPCS

## 2019-09-30 PROCEDURE — 93350 STRESS TTE ONLY: CPT

## 2019-09-30 PROCEDURE — 93017 CV STRESS TEST TRACING ONLY: CPT

## 2020-02-04 ENCOUNTER — HOSPITAL ENCOUNTER (OUTPATIENT)
Age: 75
End: 2020-02-04
Payer: MEDICARE

## 2020-02-04 VITALS — BODY MASS INDEX: 41.1 KG/M2

## 2020-02-04 DIAGNOSIS — I10: Primary | ICD-10-CM

## 2020-02-04 DIAGNOSIS — E55.9: ICD-10-CM

## 2020-02-04 LAB
ALANINE AMINOTRANSFER ALT/SGPT: 24 U/L (ref 16–61)
ALBUMIN SERPL-MCNC: 3.7 G/DL (ref 3.2–5)
ALKALINE PHOSPHATASE: 65 U/L (ref 45–117)
ANION GAP: 4 (ref 5–15)
AST(SGOT): 17 U/L (ref 15–37)
BUN SERPL-MCNC: 22 MG/DL (ref 7–18)
BUN/CREAT RATIO: 17.9 RATIO (ref 10–20)
CALCIUM SERPL-MCNC: 9.8 MG/DL (ref 8.5–10.1)
CARBON DIOXIDE: 31 MMOL/L (ref 21–32)
CHLORIDE: 103 MMOL/L (ref 98–107)
DEPRECATED RDW RBC: 54.6 FL (ref 35.1–43.9)
ERYTHROCYTE [DISTWIDTH] IN BLOOD: 15.1 % (ref 11.6–14.6)
EST GLOM FILT RATE - AFR AMER: 74 ML/MIN (ref 60–?)
GLOBULIN: 4.1 G/DL (ref 2.2–4.2)
GLUCOSE: 93 MG/DL (ref 74–106)
HCT VFR BLD AUTO: 44.2 % (ref 40–54)
HEMOGLOBIN: 13.9 G/DL (ref 13–16.5)
HGB BLD-MCNC: 13.9 G/DL (ref 13–16.5)
IMMATURE GRANULOCYTES COUNT: 0.07 X10^3/UL (ref 0–0)
MCV RBC: 98.4 FL (ref 80–94)
MEAN CORP HGB CONC: 31.4 G/DL (ref 32–36)
MEAN PLATELET VOL.: 11.2 FL (ref 6.2–12)
NRBC FLAGGED BY ANALYZER: 0 % (ref 0–5)
PLATELET # BLD: 161 K/MM3 (ref 150–450)
PLATELET COUNT: 161 K/MM3 (ref 150–450)
POTASSIUM: 3.6 MMOL/L (ref 3.5–5.1)
RBC # BLD AUTO: 4.49 M/MM3 (ref 4.6–6.2)
RBC DISTRIBUTION WIDTH CV: 15.1 % (ref 11.6–14.6)
RBC DISTRIBUTION WIDTH SD: 54.6 FL (ref 35.1–43.9)
VITAMIN D,25 HYDROXY: 11.7 NG/ML (ref 29.95–100.01)
WBC # BLD AUTO: 9 K/MM3 (ref 4.4–11)
WHITE BLOOD COUNT: 9 K/MM3 (ref 4.4–11)

## 2020-02-04 PROCEDURE — 36415 COLL VENOUS BLD VENIPUNCTURE: CPT

## 2020-02-04 PROCEDURE — 84443 ASSAY THYROID STIM HORMONE: CPT

## 2020-02-04 PROCEDURE — 85025 COMPLETE CBC W/AUTO DIFF WBC: CPT

## 2020-02-04 PROCEDURE — 82306 VITAMIN D 25 HYDROXY: CPT

## 2020-02-04 PROCEDURE — 80053 COMPREHEN METABOLIC PANEL: CPT

## 2021-02-10 ENCOUNTER — HOSPITAL ENCOUNTER (OUTPATIENT)
Age: 76
End: 2021-02-10
Payer: MEDICARE

## 2021-02-10 VITALS — BODY MASS INDEX: 44.4 KG/M2

## 2021-02-10 DIAGNOSIS — I10: ICD-10-CM

## 2021-02-10 DIAGNOSIS — E55.9: Primary | ICD-10-CM

## 2021-02-10 LAB
ALANINE AMINOTRANSFER ALT/SGPT: 20 U/L (ref 16–61)
ALBUMIN SERPL-MCNC: 3.6 G/DL (ref 3.2–5)
ALKALINE PHOSPHATASE: 80 U/L (ref 45–117)
ANION GAP: 3 (ref 5–15)
AST(SGOT): 15 U/L (ref 15–37)
BUN SERPL-MCNC: 29 MG/DL (ref 7–18)
BUN/CREAT RATIO: 21.5 RATIO (ref 10–20)
CALCIUM SERPL-MCNC: 9.1 MG/DL (ref 8.5–10.1)
CARBON DIOXIDE: 31 MMOL/L (ref 21–32)
CHLORIDE: 103 MMOL/L (ref 98–107)
DEPRECATED RDW RBC: 57.9 FL (ref 35.1–43.9)
ERYTHROCYTE [DISTWIDTH] IN BLOOD: 16.2 % (ref 11.6–14.6)
EST GLOM FILT RATE - AFR AMER: 66 ML/MIN (ref 60–?)
GLOBULIN: 3.9 G/DL (ref 2.2–4.2)
GLUCOSE: 89 MG/DL (ref 74–106)
HCT VFR BLD AUTO: 42 % (ref 40–54)
HEMOGLOBIN: 13 G/DL (ref 13–16.5)
HGB BLD-MCNC: 13 G/DL (ref 13–16.5)
IMMATURE GRANULOCYTES COUNT: 0.05 X10^3/UL (ref 0–0)
MCV RBC: 97.4 FL (ref 80–94)
MEAN CORP HGB CONC: 31 G/DL (ref 32–36)
MEAN PLATELET VOL.: 11.4 FL (ref 6.2–12)
NRBC FLAGGED BY ANALYZER: 0 % (ref 0–5)
PLATELET # BLD: 173 K/MM3 (ref 150–450)
PLATELET COUNT: 173 K/MM3 (ref 150–450)
POTASSIUM: 4.4 MMOL/L (ref 3.5–5.1)
RBC # BLD AUTO: 4.31 M/MM3 (ref 4.6–6.2)
RBC DISTRIBUTION WIDTH CV: 16.2 % (ref 11.6–14.6)
RBC DISTRIBUTION WIDTH SD: 57.9 FL (ref 35.1–43.9)
VITAMIN D,25 HYDROXY: 17.6 NG/ML
WBC # BLD AUTO: 8.3 K/MM3 (ref 4.4–11)
WHITE BLOOD COUNT: 8.3 K/MM3 (ref 4.4–11)

## 2021-02-10 PROCEDURE — 84443 ASSAY THYROID STIM HORMONE: CPT

## 2021-02-10 PROCEDURE — 82306 VITAMIN D 25 HYDROXY: CPT

## 2021-02-10 PROCEDURE — 36415 COLL VENOUS BLD VENIPUNCTURE: CPT

## 2021-02-10 PROCEDURE — 85025 COMPLETE CBC W/AUTO DIFF WBC: CPT

## 2021-02-10 PROCEDURE — 80053 COMPREHEN METABOLIC PANEL: CPT

## 2021-09-20 ENCOUNTER — HOSPITAL ENCOUNTER (OUTPATIENT)
Age: 76
End: 2021-09-20
Payer: MEDICARE

## 2021-09-20 DIAGNOSIS — I10: ICD-10-CM

## 2021-09-20 DIAGNOSIS — E55.9: Primary | ICD-10-CM

## 2021-09-20 LAB
ALANINE AMINOTRANSFER ALT/SGPT: 23 U/L (ref 16–61)
ALBUMIN SERPL-MCNC: 3.3 G/DL (ref 3.2–5)
ALKALINE PHOSPHATASE: 79 U/L (ref 45–117)
ANION GAP: 4 (ref 5–15)
AST(SGOT): 20 U/L (ref 15–37)
BUN SERPL-MCNC: 20 MG/DL (ref 7–18)
BUN/CREAT RATIO: 18.3 RATIO (ref 10–20)
CALCIUM SERPL-MCNC: 8.8 MG/DL (ref 8.5–10.1)
CARBON DIOXIDE: 29 MMOL/L (ref 21–32)
CHLORIDE: 103 MMOL/L (ref 98–107)
DEPRECATED RDW RBC: 55 FL (ref 35.1–43.9)
ERYTHROCYTE [DISTWIDTH] IN BLOOD: 15.4 % (ref 11.6–14.6)
EST GLOM FILT RATE - AFR AMER: 85 ML/MIN (ref 60–?)
GLOBULIN: 4.1 G/DL (ref 2.2–4.2)
GLUCOSE: 91 MG/DL (ref 74–106)
HCT VFR BLD AUTO: 41.3 % (ref 40–54)
HEMOGLOBIN: 12.9 G/DL (ref 13–16.5)
HGB BLD-MCNC: 12.9 G/DL (ref 13–16.5)
IMMATURE GRANULOCYTES COUNT: 0.04 X10^3/UL (ref 0–0)
MCV RBC: 97.9 FL (ref 80–94)
MEAN CORP HGB CONC: 31.2 G/DL (ref 32–36)
MEAN PLATELET VOL.: 11.2 FL (ref 6.2–12)
NRBC FLAGGED BY ANALYZER: 0 % (ref 0–5)
PLATELET # BLD: 173 K/MM3 (ref 150–450)
PLATELET COUNT: 173 K/MM3 (ref 150–450)
POTASSIUM: 4.3 MMOL/L (ref 3.5–5.1)
RBC # BLD AUTO: 4.22 M/MM3 (ref 4.6–6.2)
RBC DISTRIBUTION WIDTH CV: 15.4 % (ref 11.6–14.6)
RBC DISTRIBUTION WIDTH SD: 55 FL (ref 35.1–43.9)
VITAMIN D,25 HYDROXY: 15.2 NG/ML
WBC # BLD AUTO: 7.6 K/MM3 (ref 4.4–11)
WHITE BLOOD COUNT: 7.6 K/MM3 (ref 4.4–11)

## 2021-09-20 PROCEDURE — 84443 ASSAY THYROID STIM HORMONE: CPT

## 2021-09-20 PROCEDURE — 36415 COLL VENOUS BLD VENIPUNCTURE: CPT

## 2021-09-20 PROCEDURE — 82306 VITAMIN D 25 HYDROXY: CPT

## 2021-09-20 PROCEDURE — 80053 COMPREHEN METABOLIC PANEL: CPT

## 2021-09-20 PROCEDURE — 85025 COMPLETE CBC W/AUTO DIFF WBC: CPT

## 2022-03-21 ENCOUNTER — HOSPITAL ENCOUNTER (OUTPATIENT)
Dept: HOSPITAL 100 - POLAB3 | Age: 77
Discharge: HOME | End: 2022-03-21
Payer: MEDICARE

## 2022-03-21 DIAGNOSIS — E55.9: Primary | ICD-10-CM

## 2022-03-21 DIAGNOSIS — I10: ICD-10-CM

## 2022-03-21 LAB
ALANINE AMINOTRANSFER ALT/SGPT: 25 U/L (ref 16–61)
ALBUMIN SERPL-MCNC: 3.8 G/DL (ref 3.2–5)
ALKALINE PHOSPHATASE: 82 U/L (ref 45–117)
ANION GAP: 3 (ref 5–15)
AST(SGOT): 23 U/L (ref 15–37)
BUN SERPL-MCNC: 29 MG/DL (ref 7–18)
BUN/CREAT RATIO: 23.2 RATIO (ref 10–20)
CALCIUM SERPL-MCNC: 9.2 MG/DL (ref 8.5–10.1)
CARBON DIOXIDE: 28 MMOL/L (ref 21–32)
CHLORIDE: 104 MMOL/L (ref 98–107)
DEPRECATED RDW RBC: 55.9 FL (ref 35.1–43.9)
ERYTHROCYTE [DISTWIDTH] IN BLOOD: 15.5 % (ref 11.6–14.6)
EST GLOM FILT RATE - AFR AMER: 72 ML/MIN (ref 60–?)
GLOBULIN: 4.1 G/DL (ref 2.2–4.2)
GLUCOSE: 84 MG/DL (ref 74–106)
HCT VFR BLD AUTO: 38.2 % (ref 40–54)
HEMOGLOBIN: 12.6 G/DL (ref 13–16.5)
HGB BLD-MCNC: 12.6 G/DL (ref 13–16.5)
IMMATURE GRANULOCYTES COUNT: 0.05 X10^3/UL (ref 0–0)
MCV RBC: 97.9 FL (ref 80–94)
MEAN CORP HGB CONC: 33 G/DL (ref 32–36)
MEAN PLATELET VOL.: 10.8 FL (ref 6.2–12)
NRBC FLAGGED BY ANALYZER: 0 % (ref 0–5)
PLATELET # BLD: 173 K/MM3 (ref 150–450)
PLATELET COUNT: 173 K/MM3 (ref 150–450)
POTASSIUM: 4.7 MMOL/L (ref 3.5–5.1)
RBC # BLD AUTO: 3.9 M/MM3 (ref 4.6–6.2)
RBC DISTRIBUTION WIDTH CV: 15.5 % (ref 11.6–14.6)
RBC DISTRIBUTION WIDTH SD: 55.9 FL (ref 35.1–43.9)
VITAMIN D,25 HYDROXY: 13 NG/ML
WBC # BLD AUTO: 7.9 K/MM3 (ref 4.4–11)
WHITE BLOOD COUNT: 7.9 K/MM3 (ref 4.4–11)

## 2022-03-21 PROCEDURE — 85025 COMPLETE CBC W/AUTO DIFF WBC: CPT

## 2022-03-21 PROCEDURE — 84443 ASSAY THYROID STIM HORMONE: CPT

## 2022-03-21 PROCEDURE — 80053 COMPREHEN METABOLIC PANEL: CPT

## 2022-03-21 PROCEDURE — 82306 VITAMIN D 25 HYDROXY: CPT

## 2022-03-21 PROCEDURE — 36415 COLL VENOUS BLD VENIPUNCTURE: CPT

## 2022-07-09 ENCOUNTER — HOSPITAL ENCOUNTER (EMERGENCY)
Age: 77
Discharge: HOME | End: 2022-07-09
Payer: MEDICARE

## 2022-07-09 VITALS
HEART RATE: 50 BPM | DIASTOLIC BLOOD PRESSURE: 74 MMHG | RESPIRATION RATE: 14 BRPM | SYSTOLIC BLOOD PRESSURE: 119 MMHG | TEMPERATURE: 99.3 F | OXYGEN SATURATION: 94 %

## 2022-07-09 VITALS
HEART RATE: 66 BPM | OXYGEN SATURATION: 95 % | DIASTOLIC BLOOD PRESSURE: 89 MMHG | RESPIRATION RATE: 14 BRPM | SYSTOLIC BLOOD PRESSURE: 149 MMHG | TEMPERATURE: 98.9 F

## 2022-07-09 VITALS
SYSTOLIC BLOOD PRESSURE: 145 MMHG | HEART RATE: 72 BPM | RESPIRATION RATE: 14 BRPM | OXYGEN SATURATION: 96 % | DIASTOLIC BLOOD PRESSURE: 76 MMHG

## 2022-07-09 VITALS — BODY MASS INDEX: 41.7 KG/M2

## 2022-07-09 DIAGNOSIS — I48.91: ICD-10-CM

## 2022-07-09 DIAGNOSIS — C61: ICD-10-CM

## 2022-07-09 DIAGNOSIS — Z79.899: ICD-10-CM

## 2022-07-09 DIAGNOSIS — L71.9: ICD-10-CM

## 2022-07-09 DIAGNOSIS — J44.9: ICD-10-CM

## 2022-07-09 DIAGNOSIS — R05.3: ICD-10-CM

## 2022-07-09 DIAGNOSIS — F17.210: ICD-10-CM

## 2022-07-09 DIAGNOSIS — R21: ICD-10-CM

## 2022-07-09 DIAGNOSIS — Z95.810: ICD-10-CM

## 2022-07-09 DIAGNOSIS — Z20.822: ICD-10-CM

## 2022-07-09 DIAGNOSIS — Z79.01: ICD-10-CM

## 2022-07-09 DIAGNOSIS — I10: ICD-10-CM

## 2022-07-09 DIAGNOSIS — R51.9: Primary | ICD-10-CM

## 2022-07-09 DIAGNOSIS — E78.5: ICD-10-CM

## 2022-07-09 DIAGNOSIS — Z85.038: ICD-10-CM

## 2022-07-09 LAB
ANION GAP: 3 (ref 5–15)
BUN SERPL-MCNC: 24 MG/DL (ref 7–18)
BUN/CREAT RATIO: 20.5 RATIO (ref 10–20)
CALCIUM SERPL-MCNC: 9.2 MG/DL (ref 8.5–10.1)
CARBON DIOXIDE: 32 MMOL/L (ref 21–32)
CHLORIDE: 100 MMOL/L (ref 98–107)
DEPRECATED RDW RBC: 53.4 FL (ref 35.1–43.9)
ERYTHROCYTE [DISTWIDTH] IN BLOOD: 14.7 % (ref 11.6–14.6)
EST GLOM FILT RATE - AFR AMER: 78 ML/MIN (ref 60–?)
ESTIMATED CREATININE CLEARANCE: 63.43 ML/MIN
GLUCOSE: 96 MG/DL (ref 74–106)
HCT VFR BLD AUTO: 35.3 % (ref 40–54)
HEMOGLOBIN: 11.2 G/DL (ref 13–16.5)
HGB BLD-MCNC: 11.2 G/DL (ref 13–16.5)
IMMATURE GRANULOCYTES COUNT: 0.07 X10^3/UL (ref 0–0)
MCV RBC: 98.3 FL (ref 80–94)
MEAN CORP HGB CONC: 31.7 G/DL (ref 32–36)
MEAN PLATELET VOL.: 11.1 FL (ref 6.2–12)
NRBC FLAGGED BY ANALYZER: 0 % (ref 0–5)
PLATELET # BLD: 160 K/MM3 (ref 150–450)
PLATELET COUNT: 160 K/MM3 (ref 150–450)
POTASSIUM: 4.2 MMOL/L (ref 3.5–5.1)
PROTHROMBIN TIME (PROTIME)PT.: 20.8 SECONDS (ref 11.7–14.9)
RBC # BLD AUTO: 3.59 M/MM3 (ref 4.6–6.2)
RBC DISTRIBUTION WIDTH CV: 14.7 % (ref 11.6–14.6)
RBC DISTRIBUTION WIDTH SD: 53.4 FL (ref 35.1–43.9)
WBC # BLD AUTO: 9.2 K/MM3 (ref 4.4–11)
WHITE BLOOD COUNT: 9.2 K/MM3 (ref 4.4–11)

## 2022-07-09 PROCEDURE — 70450 CT HEAD/BRAIN W/O DYE: CPT

## 2022-07-09 PROCEDURE — 87428 SARSCOV & INF VIR A&B AG IA: CPT

## 2022-07-09 PROCEDURE — 80048 BASIC METABOLIC PNL TOTAL CA: CPT

## 2022-07-09 PROCEDURE — 87635 SARS-COV-2 COVID-19 AMP PRB: CPT

## 2022-07-09 PROCEDURE — U0003 INFECTIOUS AGENT DETECTION BY NUCLEIC ACID (DNA OR RNA); SEVERE ACUTE RESPIRATORY SYNDROME CORONAVIRUS 2 (SARS-COV-2) (CORONAVIRUS DISEASE [COVID-19]), AMPLIFIED PROBE TECHNIQUE, MAKING USE OF HIGH THROUGHPUT TECHNOLOGIES AS DESCRIBED BY CMS-2020-01-R: HCPCS

## 2022-07-09 PROCEDURE — 85025 COMPLETE CBC W/AUTO DIFF WBC: CPT

## 2022-07-09 PROCEDURE — 85610 PROTHROMBIN TIME: CPT

## 2022-07-09 PROCEDURE — 99283 EMERGENCY DEPT VISIT LOW MDM: CPT

## 2022-07-09 PROCEDURE — U0005 INFEC AGEN DETEC AMPLI PROBE: HCPCS

## 2022-09-26 ENCOUNTER — HOSPITAL ENCOUNTER (OUTPATIENT)
Dept: HOSPITAL 100 - POLAB3 | Age: 77
Discharge: HOME | End: 2022-09-26
Payer: MEDICARE

## 2022-09-26 DIAGNOSIS — D50.9: ICD-10-CM

## 2022-09-26 DIAGNOSIS — I10: ICD-10-CM

## 2022-09-26 DIAGNOSIS — E55.9: Primary | ICD-10-CM

## 2022-09-26 LAB
ALANINE AMINOTRANSFER ALT/SGPT: 19 U/L (ref 16–61)
ALBUMIN SERPL-MCNC: 3.2 G/DL (ref 3.2–5)
ALKALINE PHOSPHATASE: 77 U/L (ref 45–117)
ANION GAP: 6 (ref 5–15)
AST(SGOT): 20 U/L (ref 15–37)
BUN SERPL-MCNC: 19 MG/DL (ref 7–18)
BUN/CREAT RATIO: 14.1 RATIO (ref 10–20)
CALCIUM SERPL-MCNC: 9.4 MG/DL (ref 8.5–10.1)
CARBON DIOXIDE: 30 MMOL/L (ref 21–32)
CHLORIDE: 101 MMOL/L (ref 98–107)
DEPRECATED RDW RBC: 56.1 FL (ref 35.1–43.9)
ERYTHROCYTE [DISTWIDTH] IN BLOOD: 15.9 % (ref 11.6–14.6)
EST GLOM FILT RATE - AFR AMER: 66 ML/MIN (ref 60–?)
FERRITIN SERPL-MCNC: 163 NG/ML (ref 26–388)
FOLATES, (FOLIC ACID): 6 NG/ML (ref 3.1–55.4)
GLOBULIN: 4.1 G/DL (ref 2.2–4.2)
GLUCOSE: 83 MG/DL (ref 74–106)
HCT VFR BLD AUTO: 38.1 % (ref 40–54)
HEMOGLOBIN: 12.5 G/DL (ref 13–16.5)
HGB BLD-MCNC: 12.5 G/DL (ref 13–16.5)
IMMATURE GRANULOCYTES COUNT: 0.04 X10^3/UL (ref 0–0)
IMMATURE RETICULOCYTE FRACTION: 19.9 % (ref 3–15.9)
IRON BINDING CAPACITY,TOTAL: 307 UG/DL (ref 250–450)
IRON SATN MFR SERPL: 19.5 % (ref 15–55)
IRON SPEC-MCNT: 60 UG/DL (ref 65–175)
MCV RBC: 95.7 FL (ref 80–94)
MEAN CORP HGB CONC: 32.8 G/DL (ref 32–36)
MEAN PLATELET VOL.: 11.5 FL (ref 6.2–12)
NRBC FLAGGED BY ANALYZER: 0 % (ref 0–5)
PLATELET # BLD: 171 K/MM3 (ref 150–450)
PLATELET COUNT: 171 K/MM3 (ref 150–450)
POTASSIUM: 4.2 MMOL/L (ref 3.5–5.1)
RBC # BLD AUTO: 3.98 M/MM3 (ref 4.6–6.2)
RBC DISTRIBUTION WIDTH CV: 15.9 % (ref 11.6–14.6)
RBC DISTRIBUTION WIDTH SD: 56.1 FL (ref 35.1–43.9)
RETICS # AUTO: 2.44 % (ref 0.5–1.5)
VITAMIN B12: 350 PG/ML (ref 211–911)
VITAMIN D,25 HYDROXY: 11.9 NG/ML
WBC # BLD AUTO: 7.6 K/MM3 (ref 4.4–11)
WHITE BLOOD COUNT: 7.6 K/MM3 (ref 4.4–11)

## 2022-09-26 PROCEDURE — 82746 ASSAY OF FOLIC ACID SERUM: CPT

## 2022-09-26 PROCEDURE — 85025 COMPLETE CBC W/AUTO DIFF WBC: CPT

## 2022-09-26 PROCEDURE — 85045 AUTOMATED RETICULOCYTE COUNT: CPT

## 2022-09-26 PROCEDURE — 82728 ASSAY OF FERRITIN: CPT

## 2022-09-26 PROCEDURE — 83550 IRON BINDING TEST: CPT

## 2022-09-26 PROCEDURE — 84443 ASSAY THYROID STIM HORMONE: CPT

## 2022-09-26 PROCEDURE — 82607 VITAMIN B-12: CPT

## 2022-09-26 PROCEDURE — 80053 COMPREHEN METABOLIC PANEL: CPT

## 2022-09-26 PROCEDURE — 82306 VITAMIN D 25 HYDROXY: CPT

## 2022-09-26 PROCEDURE — 36415 COLL VENOUS BLD VENIPUNCTURE: CPT

## 2022-09-26 PROCEDURE — 83540 ASSAY OF IRON: CPT

## 2022-10-04 ENCOUNTER — HOSPITAL ENCOUNTER (OUTPATIENT)
Dept: HOSPITAL 100 - LAB | Age: 77
Discharge: HOME | End: 2022-10-04
Payer: MEDICARE

## 2022-10-04 DIAGNOSIS — D51.9: Primary | ICD-10-CM

## 2022-10-04 DIAGNOSIS — M79.609: ICD-10-CM

## 2022-10-04 PROCEDURE — 83090 ASSAY OF HOMOCYSTEINE: CPT

## 2022-10-04 PROCEDURE — 36415 COLL VENOUS BLD VENIPUNCTURE: CPT

## 2022-10-04 PROCEDURE — 83921 ORGANIC ACID SINGLE QUANT: CPT

## 2022-10-04 PROCEDURE — 73630 X-RAY EXAM OF FOOT: CPT

## 2022-10-11 LAB — METHYLMALONATE SERPL-SCNC: 339 NMOL/L (ref 0–378)

## 2022-12-09 ENCOUNTER — HOSPITAL ENCOUNTER (OUTPATIENT)
Age: 77
Discharge: HOME | End: 2022-12-09
Payer: MEDICARE

## 2022-12-09 DIAGNOSIS — M79.89: ICD-10-CM

## 2022-12-09 DIAGNOSIS — I73.89: Primary | ICD-10-CM

## 2022-12-09 PROCEDURE — 93923 UPR/LXTR ART STDY 3+ LVLS: CPT

## 2022-12-09 PROCEDURE — 93970 EXTREMITY STUDY: CPT

## 2022-12-19 ENCOUNTER — HOSPITAL ENCOUNTER (OUTPATIENT)
Dept: HOSPITAL 100 - CVS | Age: 77
Discharge: HOME | End: 2022-12-19
Payer: MEDICARE

## 2022-12-19 DIAGNOSIS — I42.0: Primary | ICD-10-CM

## 2022-12-19 PROCEDURE — C8929 TTE W OR WO FOL WCON,DOPPLER: HCPCS

## 2022-12-19 PROCEDURE — A4216 STERILE WATER/SALINE, 10 ML: HCPCS

## 2022-12-19 PROCEDURE — 93306 TTE W/DOPPLER COMPLETE: CPT

## 2023-04-06 ENCOUNTER — HOSPITAL ENCOUNTER (OUTPATIENT)
Dept: HOSPITAL 100 - POLAB3 | Age: 78
Discharge: HOME | End: 2023-04-06
Payer: MEDICARE

## 2023-04-06 DIAGNOSIS — I10: ICD-10-CM

## 2023-04-06 DIAGNOSIS — E55.9: Primary | ICD-10-CM

## 2023-04-06 LAB
ALANINE AMINOTRANSFER ALT/SGPT: 18 U/L (ref 16–61)
ALBUMIN SERPL-MCNC: 3.3 G/DL (ref 3.2–5)
ALKALINE PHOSPHATASE: 67 U/L (ref 45–117)
ANION GAP: 5 (ref 5–15)
AST(SGOT): 17 U/L (ref 15–37)
BUN SERPL-MCNC: 28 MG/DL (ref 7–18)
BUN/CREAT RATIO: 20 RATIO (ref 10–20)
CALCIUM SERPL-MCNC: 9.1 MG/DL (ref 8.5–10.1)
CARBON DIOXIDE: 30 MMOL/L (ref 21–32)
CHLORIDE: 99 MMOL/L (ref 98–107)
DEPRECATED RDW RBC: 55.1 FL (ref 35.1–43.9)
ERYTHROCYTE [DISTWIDTH] IN BLOOD: 15.6 % (ref 11.6–14.6)
EST GLOM FILT RATE - AFR AMER: 63 ML/MIN (ref 60–?)
GLOBULIN: 3.9 G/DL (ref 2.2–4.2)
GLUCOSE: 108 MG/DL (ref 74–106)
HCT VFR BLD AUTO: 38.5 % (ref 40–54)
HEMOGLOBIN: 12.1 G/DL (ref 13–16.5)
HGB BLD-MCNC: 12.1 G/DL (ref 13–16.5)
IMMATURE GRANULOCYTES COUNT: 0.04 X10^3/UL (ref 0–0)
MCV RBC: 96.5 FL (ref 80–94)
MEAN CORP HGB CONC: 31.4 G/DL (ref 32–36)
MEAN PLATELET VOL.: 10.4 FL (ref 6.2–12)
NRBC FLAGGED BY ANALYZER: 0 % (ref 0–5)
PLATELET # BLD: 184 K/MM3 (ref 150–450)
PLATELET COUNT: 184 K/MM3 (ref 150–450)
POTASSIUM: 4 MMOL/L (ref 3.5–5.1)
RBC # BLD AUTO: 3.99 M/MM3 (ref 4.6–6.2)
RBC DISTRIBUTION WIDTH CV: 15.6 % (ref 11.6–14.6)
RBC DISTRIBUTION WIDTH SD: 55.1 FL (ref 35.1–43.9)
VITAMIN D,25 HYDROXY: 11 NG/ML
WBC # BLD AUTO: 7.2 K/MM3 (ref 4.4–11)
WHITE BLOOD COUNT: 7.2 K/MM3 (ref 4.4–11)

## 2023-04-06 PROCEDURE — 80053 COMPREHEN METABOLIC PANEL: CPT

## 2023-04-06 PROCEDURE — 84443 ASSAY THYROID STIM HORMONE: CPT

## 2023-04-06 PROCEDURE — 36415 COLL VENOUS BLD VENIPUNCTURE: CPT

## 2023-04-06 PROCEDURE — 85025 COMPLETE CBC W/AUTO DIFF WBC: CPT

## 2023-04-06 PROCEDURE — 82306 VITAMIN D 25 HYDROXY: CPT

## 2023-05-22 ENCOUNTER — HOSPITAL ENCOUNTER (OUTPATIENT)
Age: 78
Discharge: HOME | End: 2023-05-22
Payer: MEDICARE

## 2023-05-22 DIAGNOSIS — R06.09: Primary | ICD-10-CM

## 2023-05-22 LAB
ANION GAP: 4 (ref 5–15)
BNP,B-TYPE NATRIURETIC PEPTIDE: 117.7 PG/ML (ref 0–100)
BUN SERPL-MCNC: 19 MG/DL (ref 7–18)
BUN/CREAT RATIO: 14.6 RATIO (ref 10–20)
CALCIUM SERPL-MCNC: 9.4 MG/DL (ref 8.5–10.1)
CARBON DIOXIDE: 32 MMOL/L (ref 21–32)
CHLORIDE: 102 MMOL/L (ref 98–107)
EST GLOM FILT RATE - AFR AMER: 69 ML/MIN (ref 60–?)
GLUCOSE: 106 MG/DL (ref 74–106)
POTASSIUM: 4.5 MMOL/L (ref 3.5–5.1)

## 2023-05-22 PROCEDURE — 83880 ASSAY OF NATRIURETIC PEPTIDE: CPT

## 2023-05-22 PROCEDURE — 36415 COLL VENOUS BLD VENIPUNCTURE: CPT

## 2023-05-22 PROCEDURE — 71046 X-RAY EXAM CHEST 2 VIEWS: CPT

## 2023-05-22 PROCEDURE — 80048 BASIC METABOLIC PNL TOTAL CA: CPT

## 2023-09-26 ENCOUNTER — HOSPITAL ENCOUNTER (OUTPATIENT)
Age: 78
Discharge: HOME | End: 2023-09-26
Payer: MEDICARE

## 2023-09-26 DIAGNOSIS — R06.09: ICD-10-CM

## 2023-09-26 DIAGNOSIS — I42.0: ICD-10-CM

## 2023-09-26 DIAGNOSIS — R05.8: Primary | ICD-10-CM

## 2023-09-26 LAB
ANION GAP: 3 (ref 5–15)
BNP,B-TYPE NATRIURETIC PEPTIDE: 68.9 PG/ML (ref 0–100)
BUN SERPL-MCNC: 17 MG/DL (ref 7–18)
BUN/CREAT RATIO: 11.9 RATIO (ref 10–20)
CALCIUM SERPL-MCNC: 9 MG/DL (ref 8.5–10.1)
CARBON DIOXIDE: 31 MMOL/L (ref 21–32)
CHLORIDE: 101 MMOL/L (ref 98–107)
DEPRECATED RDW RBC: 58.3 FL (ref 35.1–43.9)
ERYTHROCYTE [DISTWIDTH] IN BLOOD: 17.2 % (ref 11.6–14.6)
EST GLOM FILT RATE - AFR AMER: 62 ML/MIN (ref 60–?)
GLUCOSE: 101 MG/DL (ref 74–106)
HCT VFR BLD AUTO: 38.1 % (ref 40–54)
HEMOGLOBIN: 11.9 G/DL (ref 13–16.5)
HGB BLD-MCNC: 11.9 G/DL (ref 13–16.5)
MCV RBC: 91.8 FL (ref 80–94)
MEAN CORP HGB CONC: 31.2 G/DL (ref 32–36)
MEAN PLATELET VOL.: 10.3 FL (ref 6.2–12)
PLATELET # BLD: 192 K/MM3 (ref 150–450)
PLATELET COUNT: 192 K/MM3 (ref 150–450)
POTASSIUM: 4 MMOL/L (ref 3.5–5.1)
RBC # BLD AUTO: 4.15 M/MM3 (ref 4.6–6.2)
RBC DISTRIBUTION WIDTH CV: 17.2 % (ref 11.6–14.6)
RBC DISTRIBUTION WIDTH SD: 58.3 FL (ref 35.1–43.9)
WBC # BLD AUTO: 7.7 K/MM3 (ref 4.4–11)
WHITE BLOOD COUNT: 7.7 K/MM3 (ref 4.4–11)

## 2023-09-26 PROCEDURE — 80048 BASIC METABOLIC PNL TOTAL CA: CPT

## 2023-09-26 PROCEDURE — 71046 X-RAY EXAM CHEST 2 VIEWS: CPT

## 2023-09-26 PROCEDURE — 36415 COLL VENOUS BLD VENIPUNCTURE: CPT

## 2023-09-26 PROCEDURE — 83880 ASSAY OF NATRIURETIC PEPTIDE: CPT

## 2023-09-26 PROCEDURE — 85027 COMPLETE CBC AUTOMATED: CPT

## 2023-10-23 PROBLEM — R35.1 NOCTURIA: Status: ACTIVE | Noted: 2023-10-23

## 2023-10-23 PROBLEM — J45.909 ASTHMA (HHS-HCC): Status: ACTIVE | Noted: 2023-10-23

## 2023-10-23 PROBLEM — I10 HTN (HYPERTENSION): Status: ACTIVE | Noted: 2023-10-23

## 2023-10-23 PROBLEM — E78.5 HYPERLIPIDEMIA: Status: ACTIVE | Noted: 2023-10-23

## 2023-10-23 PROBLEM — Z86.79 HISTORY OF CHF (CONGESTIVE HEART FAILURE): Status: ACTIVE | Noted: 2023-10-23

## 2023-10-23 PROBLEM — R33.9 URINARY RETENTION: Status: ACTIVE | Noted: 2023-10-23

## 2023-10-23 PROBLEM — K21.9 GERD (GASTROESOPHAGEAL REFLUX DISEASE): Status: ACTIVE | Noted: 2023-10-23

## 2023-10-23 PROBLEM — C61 PROSTATE CANCER (MULTI): Status: ACTIVE | Noted: 2023-10-23

## 2023-10-23 PROBLEM — N32.89 BLADDER SPASM: Status: ACTIVE | Noted: 2023-10-23

## 2023-10-23 PROBLEM — N52.9 ED (ERECTILE DYSFUNCTION): Status: ACTIVE | Noted: 2023-10-23

## 2023-10-23 PROBLEM — Z87.39 H/O DEGENERATIVE DISC DISEASE: Status: ACTIVE | Noted: 2023-10-23

## 2023-10-23 PROBLEM — F32.A DEPRESSION: Status: ACTIVE | Noted: 2023-10-23

## 2023-10-23 RX ORDER — IPRATROPIUM BROMIDE AND ALBUTEROL SULFATE 2.5; .5 MG/3ML; MG/3ML
SOLUTION RESPIRATORY (INHALATION)
COMMUNITY
Start: 2021-10-25

## 2023-10-23 RX ORDER — DIGOXIN 250 MCG
TABLET ORAL
COMMUNITY

## 2023-10-23 RX ORDER — FUROSEMIDE 20 MG/1
TABLET ORAL
COMMUNITY

## 2023-10-23 RX ORDER — TRAMADOL HYDROCHLORIDE 50 MG/1
TABLET ORAL
COMMUNITY

## 2023-10-23 RX ORDER — METOPROLOL TARTRATE 25 MG/1
TABLET, FILM COATED ORAL
COMMUNITY

## 2023-10-23 RX ORDER — CITALOPRAM 20 MG/1
TABLET, FILM COATED ORAL
COMMUNITY

## 2023-10-23 RX ORDER — WARFARIN 4 MG/1
TABLET ORAL
COMMUNITY

## 2023-10-23 RX ORDER — OXYBUTYNIN CHLORIDE 15 MG/1
2 TABLET, EXTENDED RELEASE ORAL DAILY
COMMUNITY
Start: 2021-02-24 | End: 2023-12-11

## 2023-10-23 RX ORDER — CLOTRIMAZOLE AND BETAMETHASONE DIPROPIONATE 10; .64 MG/G; MG/G
CREAM TOPICAL
COMMUNITY

## 2023-10-23 RX ORDER — LISINOPRIL 10 MG/1
TABLET ORAL
COMMUNITY

## 2023-10-23 RX ORDER — FUROSEMIDE 40 MG/1
TABLET ORAL
COMMUNITY

## 2023-10-23 RX ORDER — PRAVASTATIN SODIUM 40 MG/1
TABLET ORAL
COMMUNITY

## 2023-10-25 ENCOUNTER — APPOINTMENT (OUTPATIENT)
Dept: UROLOGY | Facility: CLINIC | Age: 78
End: 2023-10-25
Payer: MEDICARE

## 2023-11-07 ENCOUNTER — APPOINTMENT (OUTPATIENT)
Dept: UROLOGY | Facility: CLINIC | Age: 78
End: 2023-11-07
Payer: MEDICARE

## 2023-12-08 DIAGNOSIS — N32.89 BLADDER SPASM: Primary | ICD-10-CM

## 2023-12-11 RX ORDER — OXYBUTYNIN CHLORIDE 15 MG/1
30 TABLET, EXTENDED RELEASE ORAL DAILY
Qty: 180 TABLET | Refills: 3 | Status: SHIPPED | OUTPATIENT
Start: 2023-12-11

## 2024-01-11 ENCOUNTER — HOSPITAL ENCOUNTER (OUTPATIENT)
Dept: HOSPITAL 100 - RAD | Age: 79
Discharge: HOME | End: 2024-01-11
Payer: MEDICARE

## 2024-01-11 DIAGNOSIS — I42.0: ICD-10-CM

## 2024-01-11 DIAGNOSIS — R06.09: Primary | ICD-10-CM

## 2024-01-11 DIAGNOSIS — E55.9: ICD-10-CM

## 2024-01-11 LAB
ANION GAP: 3 (ref 5–15)
BNP,B-TYPE NATRIURETIC PEPTIDE: 91.5 PG/ML (ref 0–100)
BUN SERPL-MCNC: 19 MG/DL (ref 7–18)
BUN/CREAT RATIO: 17.1 RATIO (ref 10–20)
CALCIUM SERPL-MCNC: 9.5 MG/DL (ref 8.5–10.1)
CARBON DIOXIDE: 32 MMOL/L (ref 21–32)
CHLORIDE: 100 MMOL/L (ref 98–107)
DEPRECATED RDW RBC: 57.8 FL (ref 35.1–43.9)
ERYTHROCYTE [DISTWIDTH] IN BLOOD: 17.2 % (ref 11.6–14.6)
EST GLOM FILT RATE - AFR AMER: 83 ML/MIN (ref 60–?)
GLUCOSE: 89 MG/DL (ref 74–106)
HCT VFR BLD AUTO: 36 % (ref 40–54)
HGB BLD-MCNC: 11.2 G/DL (ref 13–16.5)
IMMATURE GRANULOCYTES COUNT: 0.03 X10^3/UL (ref 0–0)
MCV RBC: 91.6 FL (ref 80–94)
MEAN CORP HGB CONC: 31.1 G/DL (ref 32–36)
MEAN PLATELET VOL.: 9.7 FL (ref 6.2–12)
NRBC FLAGGED BY ANALYZER: 0 % (ref 0–5)
PLATELET # BLD: 191 K/MM3 (ref 150–450)
POTASSIUM: 4.3 MMOL/L (ref 3.5–5.1)
RBC # BLD AUTO: 3.93 M/MM3 (ref 4.6–6.2)
VITAMIN D,25 HYDROXY: 47.9 NG/ML
WBC # BLD AUTO: 6.8 K/MM3 (ref 4.4–11)

## 2024-01-11 PROCEDURE — 82306 VITAMIN D 25 HYDROXY: CPT

## 2024-01-11 PROCEDURE — 80048 BASIC METABOLIC PNL TOTAL CA: CPT

## 2024-01-11 PROCEDURE — 85025 COMPLETE CBC W/AUTO DIFF WBC: CPT

## 2024-01-11 PROCEDURE — 83880 ASSAY OF NATRIURETIC PEPTIDE: CPT

## 2024-01-11 PROCEDURE — 71046 X-RAY EXAM CHEST 2 VIEWS: CPT

## 2024-01-11 PROCEDURE — 36415 COLL VENOUS BLD VENIPUNCTURE: CPT

## 2024-04-02 ENCOUNTER — HOSPITAL ENCOUNTER (OUTPATIENT)
Dept: HOSPITAL 100 - CVS | Age: 79
Discharge: HOME | End: 2024-04-02
Payer: MEDICARE

## 2024-04-02 DIAGNOSIS — R06.09: ICD-10-CM

## 2024-04-02 DIAGNOSIS — I42.0: Primary | ICD-10-CM

## 2024-04-02 PROCEDURE — A4216 STERILE WATER/SALINE, 10 ML: HCPCS

## 2024-04-02 PROCEDURE — C8929 TTE W OR WO FOL WCON,DOPPLER: HCPCS

## 2024-04-02 PROCEDURE — 93306 TTE W/DOPPLER COMPLETE: CPT

## 2024-07-02 ENCOUNTER — HOSPITAL ENCOUNTER (INPATIENT)
Dept: HOSPITAL 100 - ED | Age: 79
LOS: 4 days | Discharge: NURSING, REHAB OR LONG-TERM CARE | DRG: 291 | End: 2024-07-06
Payer: MEDICARE

## 2024-07-02 VITALS
HEART RATE: 71 BPM | RESPIRATION RATE: 20 BRPM | DIASTOLIC BLOOD PRESSURE: 43 MMHG | SYSTOLIC BLOOD PRESSURE: 118 MMHG | OXYGEN SATURATION: 100 %

## 2024-07-02 VITALS — OXYGEN SATURATION: 91 %

## 2024-07-02 VITALS
HEART RATE: 66 BPM | OXYGEN SATURATION: 93 % | TEMPERATURE: 98.3 F | SYSTOLIC BLOOD PRESSURE: 117 MMHG | DIASTOLIC BLOOD PRESSURE: 62 MMHG | RESPIRATION RATE: 20 BRPM

## 2024-07-02 VITALS
OXYGEN SATURATION: 90 % | SYSTOLIC BLOOD PRESSURE: 102 MMHG | HEART RATE: 65 BPM | DIASTOLIC BLOOD PRESSURE: 53 MMHG | RESPIRATION RATE: 21 BRPM | TEMPERATURE: 98.24 F

## 2024-07-02 VITALS
HEART RATE: 74 BPM | TEMPERATURE: 98.3 F | DIASTOLIC BLOOD PRESSURE: 89 MMHG | RESPIRATION RATE: 20 BRPM | OXYGEN SATURATION: 92 % | SYSTOLIC BLOOD PRESSURE: 137 MMHG

## 2024-07-02 VITALS
DIASTOLIC BLOOD PRESSURE: 43 MMHG | SYSTOLIC BLOOD PRESSURE: 118 MMHG | HEART RATE: 70 BPM | RESPIRATION RATE: 20 BRPM | OXYGEN SATURATION: 88 % | TEMPERATURE: 97.8 F

## 2024-07-02 VITALS
HEART RATE: 63 BPM | OXYGEN SATURATION: 90 % | TEMPERATURE: 98.24 F | SYSTOLIC BLOOD PRESSURE: 102 MMHG | DIASTOLIC BLOOD PRESSURE: 53 MMHG | RESPIRATION RATE: 20 BRPM

## 2024-07-02 VITALS — RESPIRATION RATE: 18 BRPM | HEART RATE: 60 BPM

## 2024-07-02 VITALS — OXYGEN SATURATION: 87 %

## 2024-07-02 VITALS — BODY MASS INDEX: 36.8 KG/M2 | BODY MASS INDEX: 35.4 KG/M2 | BODY MASS INDEX: 37.4 KG/M2

## 2024-07-02 VITALS — OXYGEN SATURATION: 92 %

## 2024-07-02 DIAGNOSIS — D35.02: ICD-10-CM

## 2024-07-02 DIAGNOSIS — J44.1: ICD-10-CM

## 2024-07-02 DIAGNOSIS — Z79.891: ICD-10-CM

## 2024-07-02 DIAGNOSIS — I50.33: ICD-10-CM

## 2024-07-02 DIAGNOSIS — Z95.810: ICD-10-CM

## 2024-07-02 DIAGNOSIS — Z86.79: ICD-10-CM

## 2024-07-02 DIAGNOSIS — F41.9: ICD-10-CM

## 2024-07-02 DIAGNOSIS — G47.33: ICD-10-CM

## 2024-07-02 DIAGNOSIS — Z79.01: ICD-10-CM

## 2024-07-02 DIAGNOSIS — J96.11: ICD-10-CM

## 2024-07-02 DIAGNOSIS — F32.A: ICD-10-CM

## 2024-07-02 DIAGNOSIS — I11.0: Primary | ICD-10-CM

## 2024-07-02 DIAGNOSIS — K21.9: ICD-10-CM

## 2024-07-02 DIAGNOSIS — I48.11: ICD-10-CM

## 2024-07-02 DIAGNOSIS — E78.5: ICD-10-CM

## 2024-07-02 DIAGNOSIS — C61: ICD-10-CM

## 2024-07-02 DIAGNOSIS — I87.8: ICD-10-CM

## 2024-07-02 DIAGNOSIS — K80.20: ICD-10-CM

## 2024-07-02 DIAGNOSIS — D35.01: ICD-10-CM

## 2024-07-02 DIAGNOSIS — N30.00: ICD-10-CM

## 2024-07-02 DIAGNOSIS — I27.20: ICD-10-CM

## 2024-07-02 DIAGNOSIS — I25.10: ICD-10-CM

## 2024-07-02 DIAGNOSIS — I48.20: ICD-10-CM

## 2024-07-02 LAB
ALANINE AMINOTRANSFER ALT/SGPT: 12 U/L (ref 16–61)
ALBUMIN SERPL-MCNC: 3.1 G/DL (ref 3.2–5)
ALKALINE PHOSPHATASE: 86 U/L (ref 45–117)
ANION GAP: 6 (ref 5–15)
AST(SGOT): 13 U/L (ref 15–37)
BILIRUB DIRECT SERPL-MCNC: 0.28 MG/DL (ref 0–0.3)
BUN SERPL-MCNC: 20 MG/DL (ref 7–18)
BUN/CREAT RATIO: 17.9 RATIO (ref 10–20)
CALCIUM SERPL-MCNC: 9.2 MG/DL (ref 8.5–10.1)
CARBON DIOXIDE: 31 MMOL/L (ref 21–32)
CHLORIDE: 95 MMOL/L (ref 98–107)
D-DIMER QUANTITATIVE (DVT/PE): 0.41 FEU/UG/M (ref 0.27–0.49)
DEPRECATED RDW RBC: 56.2 FL (ref 35.1–43.9)
ERYTHROCYTE [DISTWIDTH] IN BLOOD: 17.2 % (ref 11.6–14.6)
EST GLOM FILT RATE - AFR AMER: 82 ML/MIN (ref 60–?)
ESTIMATED CREATININE CLEARANCE: 79.84 ML/MIN
GLOBULIN: 4.1 G/DL (ref 2.2–4.2)
GLUCOSE: 111 MG/DL (ref 74–106)
HCT VFR BLD AUTO: 36.4 % (ref 40–54)
HEMOGLOBIN: 11.2 G/DL (ref 13–16.5)
HGB BLD-MCNC: 11.2 G/DL (ref 13–16.5)
IMMATURE GRANULOCYTES COUNT: 0.04 X10^3/UL (ref 0–0)
LEUKOCYTE ESTERASE UR QL STRIP: 500 /UL
LIPASE: 36 U/L (ref 13–75)
MCV RBC: 89.2 FL (ref 80–94)
MEAN CORP HGB CONC: 30.8 G/DL (ref 32–36)
MEAN PLATELET VOL.: 10.2 FL (ref 6.2–12)
MUCOUS THREADS URNS QL MICRO: (no result) /HPF
NRBC FLAGGED BY ANALYZER: 0 % (ref 0–5)
PLATELET # BLD: 196 K/MM3 (ref 150–450)
PLATELET COUNT: 196 K/MM3 (ref 150–450)
POTASSIUM: 4.4 MMOL/L (ref 3.5–5.1)
PROT UR QL STRIP.AUTO: 15 MG/DL
PROTHROMBIN TIME (PROTIME)PT.: 20.5 SECONDS (ref 11.7–14.9)
RBC # BLD AUTO: 4.08 M/MM3 (ref 4.6–6.2)
RBC DISTRIBUTION WIDTH CV: 17.2 % (ref 11.6–14.6)
RBC DISTRIBUTION WIDTH SD: 56.2 FL (ref 35.1–43.9)
RBC UR QL: (no result) /HPF (ref 0–5)
RBC UR QL: 50 /UL
SP GR UR: 1 (ref 1–1.03)
SQUAMOUS URNS QL MICRO: (no result) /HPF (ref 0–5)
TROPONIN-I HS: 14 PG/ML (ref 3–78)
URINE PRESERVATIVE: (no result)
WBC # BLD AUTO: 7.9 K/MM3 (ref 4.4–11)
WHITE BLOOD COUNT: 7.9 K/MM3 (ref 4.4–11)

## 2024-07-02 PROCEDURE — 97116 GAIT TRAINING THERAPY: CPT

## 2024-07-02 PROCEDURE — 85379 FIBRIN DEGRADATION QUANT: CPT

## 2024-07-02 PROCEDURE — 80053 COMPREHEN METABOLIC PANEL: CPT

## 2024-07-02 PROCEDURE — 87633 RESP VIRUS 12-25 TARGETS: CPT

## 2024-07-02 PROCEDURE — 36415 COLL VENOUS BLD VENIPUNCTURE: CPT

## 2024-07-02 PROCEDURE — 87631 RESP VIRUS 3-5 TARGETS: CPT

## 2024-07-02 PROCEDURE — 80048 BASIC METABOLIC PNL TOTAL CA: CPT

## 2024-07-02 PROCEDURE — 94002 VENT MGMT INPAT INIT DAY: CPT

## 2024-07-02 PROCEDURE — A4216 STERILE WATER/SALINE, 10 ML: HCPCS

## 2024-07-02 PROCEDURE — 97162 PT EVAL MOD COMPLEX 30 MIN: CPT

## 2024-07-02 PROCEDURE — 83735 ASSAY OF MAGNESIUM: CPT

## 2024-07-02 PROCEDURE — 83036 HEMOGLOBIN GLYCOSYLATED A1C: CPT

## 2024-07-02 PROCEDURE — 87086 URINE CULTURE/COLONY COUNT: CPT

## 2024-07-02 PROCEDURE — 99285 EMERGENCY DEPT VISIT HI MDM: CPT

## 2024-07-02 PROCEDURE — 97110 THERAPEUTIC EXERCISES: CPT

## 2024-07-02 PROCEDURE — P9047 ALBUMIN (HUMAN), 25%, 50ML: HCPCS

## 2024-07-02 PROCEDURE — 85025 COMPLETE CBC W/AUTO DIFF WBC: CPT

## 2024-07-02 PROCEDURE — 51702 INSERT TEMP BLADDER CATH: CPT

## 2024-07-02 PROCEDURE — 74177 CT ABD & PELVIS W/CONTRAST: CPT

## 2024-07-02 PROCEDURE — 93005 ELECTROCARDIOGRAM TRACING: CPT

## 2024-07-02 PROCEDURE — 97530 THERAPEUTIC ACTIVITIES: CPT

## 2024-07-02 PROCEDURE — 97535 SELF CARE MNGMENT TRAINING: CPT

## 2024-07-02 PROCEDURE — 84484 ASSAY OF TROPONIN QUANT: CPT

## 2024-07-02 PROCEDURE — 71045 X-RAY EXAM CHEST 1 VIEW: CPT

## 2024-07-02 PROCEDURE — 81001 URINALYSIS AUTO W/SCOPE: CPT

## 2024-07-02 PROCEDURE — 83880 ASSAY OF NATRIURETIC PEPTIDE: CPT

## 2024-07-02 PROCEDURE — 80061 LIPID PANEL: CPT

## 2024-07-02 PROCEDURE — 97166 OT EVAL MOD COMPLEX 45 MIN: CPT

## 2024-07-02 PROCEDURE — 94762 N-INVAS EAR/PLS OXIMTRY CONT: CPT

## 2024-07-02 PROCEDURE — 83690 ASSAY OF LIPASE: CPT

## 2024-07-02 PROCEDURE — 80076 HEPATIC FUNCTION PANEL: CPT

## 2024-07-02 PROCEDURE — 94003 VENT MGMT INPAT SUBQ DAY: CPT

## 2024-07-02 PROCEDURE — 85610 PROTHROMBIN TIME: CPT

## 2024-07-02 PROCEDURE — 94640 AIRWAY INHALATION TREATMENT: CPT

## 2024-07-02 PROCEDURE — 84443 ASSAY THYROID STIM HORMONE: CPT

## 2024-07-02 PROCEDURE — 84100 ASSAY OF PHOSPHORUS: CPT

## 2024-07-02 PROCEDURE — 93970 EXTREMITY STUDY: CPT

## 2024-07-02 PROCEDURE — 82803 BLOOD GASES ANY COMBINATION: CPT

## 2024-07-03 ENCOUNTER — LAB REQUISITION (OUTPATIENT)
Dept: LAB | Facility: HOSPITAL | Age: 79
End: 2024-07-03
Payer: MEDICARE

## 2024-07-03 VITALS
HEART RATE: 58 BPM | SYSTOLIC BLOOD PRESSURE: 106 MMHG | DIASTOLIC BLOOD PRESSURE: 71 MMHG | RESPIRATION RATE: 18 BRPM | OXYGEN SATURATION: 94 % | TEMPERATURE: 97.7 F

## 2024-07-03 VITALS
HEART RATE: 53 BPM | OXYGEN SATURATION: 93 % | DIASTOLIC BLOOD PRESSURE: 68 MMHG | TEMPERATURE: 98.06 F | RESPIRATION RATE: 20 BRPM | SYSTOLIC BLOOD PRESSURE: 119 MMHG

## 2024-07-03 VITALS
SYSTOLIC BLOOD PRESSURE: 106 MMHG | RESPIRATION RATE: 18 BRPM | DIASTOLIC BLOOD PRESSURE: 71 MMHG | OXYGEN SATURATION: 94 % | HEART RATE: 58 BPM | TEMPERATURE: 97.7 F

## 2024-07-03 VITALS
HEART RATE: 57 BPM | TEMPERATURE: 97.52 F | OXYGEN SATURATION: 96 % | RESPIRATION RATE: 18 BRPM | DIASTOLIC BLOOD PRESSURE: 86 MMHG | SYSTOLIC BLOOD PRESSURE: 121 MMHG

## 2024-07-03 VITALS
HEART RATE: 60 BPM | SYSTOLIC BLOOD PRESSURE: 102 MMHG | DIASTOLIC BLOOD PRESSURE: 61 MMHG | OXYGEN SATURATION: 92 % | RESPIRATION RATE: 20 BRPM | TEMPERATURE: 97.34 F

## 2024-07-03 VITALS — OXYGEN SATURATION: 93 %

## 2024-07-03 VITALS — OXYGEN SATURATION: 95 % | HEART RATE: 71 BPM | RESPIRATION RATE: 18 BRPM

## 2024-07-03 VITALS
OXYGEN SATURATION: 95 % | TEMPERATURE: 98 F | HEART RATE: 66 BPM | SYSTOLIC BLOOD PRESSURE: 124 MMHG | RESPIRATION RATE: 20 BRPM | DIASTOLIC BLOOD PRESSURE: 80 MMHG

## 2024-07-03 VITALS
DIASTOLIC BLOOD PRESSURE: 64 MMHG | HEART RATE: 62 BPM | SYSTOLIC BLOOD PRESSURE: 104 MMHG | TEMPERATURE: 98.1 F | RESPIRATION RATE: 20 BRPM | OXYGEN SATURATION: 91 %

## 2024-07-03 VITALS — DIASTOLIC BLOOD PRESSURE: 58 MMHG | SYSTOLIC BLOOD PRESSURE: 119 MMHG

## 2024-07-03 VITALS
TEMPERATURE: 97.16 F | DIASTOLIC BLOOD PRESSURE: 60 MMHG | SYSTOLIC BLOOD PRESSURE: 113 MMHG | HEART RATE: 76 BPM | OXYGEN SATURATION: 94 % | RESPIRATION RATE: 18 BRPM

## 2024-07-03 VITALS
SYSTOLIC BLOOD PRESSURE: 102 MMHG | DIASTOLIC BLOOD PRESSURE: 61 MMHG | TEMPERATURE: 97.34 F | OXYGEN SATURATION: 92 % | HEART RATE: 62 BPM | RESPIRATION RATE: 20 BRPM

## 2024-07-03 VITALS — HEART RATE: 54 BPM | OXYGEN SATURATION: 93 %

## 2024-07-03 VITALS — HEART RATE: 76 BPM | SYSTOLIC BLOOD PRESSURE: 113 MMHG | DIASTOLIC BLOOD PRESSURE: 60 MMHG

## 2024-07-03 VITALS — OXYGEN SATURATION: 92 %

## 2024-07-03 LAB
ALANINE AMINOTRANSFER ALT/SGPT: 13 U/L (ref 16–61)
ALBUMIN SERPL-MCNC: 3.5 G/DL (ref 3.2–5)
ALKALINE PHOSPHATASE: 85 U/L (ref 45–117)
ANION GAP: 5 (ref 5–15)
AST(SGOT): 12 U/L (ref 15–37)
BASE EXCESS BLDV CALC-SCNC: 8 MMOL/L
BNP SERPL-MCNC: 100 PG/ML (ref 0–99)
BUN SERPL-MCNC: 19 MG/DL (ref 7–18)
BUN/CREAT RATIO: 18.1 RATIO (ref 10–20)
CALCIUM SERPL-MCNC: 9.3 MG/DL (ref 8.5–10.1)
CARBON DIOXIDE: 30 MMOL/L (ref 21–32)
CHLORIDE: 97 MMOL/L (ref 98–107)
CHOLEST SERPL-MCNC: 120 MG/DL
D-DIMER QUANTITATIVE (DVT/PE): 0.49 FEU/UG/M (ref 0.27–0.49)
DEPRECATED RDW RBC: 56.5 FL (ref 35.1–43.9)
DIFFERENTIAL INDICATED: (no result)
ERYTHROCYTE [DISTWIDTH] IN BLOOD: 17.2 % (ref 11.6–14.6)
EST GLOM FILT RATE - AFR AMER: 88 ML/MIN (ref 60–?)
ESTIMATED CREATININE CLEARANCE: 84.57 ML/MIN
FI02: 3
GLOBULIN: 4.1 G/DL (ref 2.2–4.2)
GLUCOSE: 145 MG/DL (ref 74–106)
HCT VFR BLD AUTO: 36.5 % (ref 40–54)
HEMOGLOBIN: 11.1 G/DL (ref 13–16.5)
HGB BLD-MCNC: 11.1 G/DL (ref 13–16.5)
IMMATURE GRANULOCYTES COUNT: 0.03 X10^3/UL (ref 0–0)
MAGNESIUM: 2.1 MG/DL (ref 1.6–2.6)
MCV RBC: 89.7 FL (ref 80–94)
MEAN CORP HGB CONC: 30.4 G/DL (ref 32–36)
MEAN PLATELET VOL.: 10.3 FL (ref 6.2–12)
NRBC FLAGGED BY ANALYZER: 0 % (ref 0–5)
PLATELET # BLD: 197 K/MM3 (ref 150–450)
PLATELET COUNT: 197 K/MM3 (ref 150–450)
PO2 BLDA: 53 MMHG (ref 75–100)
POSITIVE DIFFERENTIAL: YES
POTASSIUM: 4 MMOL/L (ref 3.5–5.1)
PROTHROMBIN TIME (PROTIME)PT.: 21.9 SECONDS (ref 11.7–14.9)
RBC # BLD AUTO: 4.07 M/MM3 (ref 4.6–6.2)
RBC DISTRIBUTION WIDTH CV: 17.2 % (ref 11.6–14.6)
RBC DISTRIBUTION WIDTH SD: 56.5 FL (ref 35.1–43.9)
SO2: 86 % (ref 95–99)
TRIGLYCERIDES: 47 MG/DL
TROPONIN-I HS: 14 PG/ML (ref 3–78)
TROPONIN-I HS: 15 PG/ML (ref 3–78)
TROPONIN-I HS: 15 PG/ML (ref 3–78)
VLDLC SERPL-MCNC: 9 MG/DL (ref 5–40)
WBC # BLD AUTO: 7.6 K/MM3 (ref 4.4–11)
WHITE BLOOD COUNT: 7.6 K/MM3 (ref 4.4–11)

## 2024-07-03 PROCEDURE — 83880 ASSAY OF NATRIURETIC PEPTIDE: CPT

## 2024-07-03 RX ADMIN — Medication 2 CAP: at 21:34

## 2024-07-03 RX ADMIN — POTASSIUM CHLORIDE 20 MEQ: 1500 TABLET, EXTENDED RELEASE ORAL at 17:37

## 2024-07-03 RX ADMIN — Medication 2 CAP: at 09:54

## 2024-07-03 RX ADMIN — TOLTERODINE TARTRATE 4 MG: 4 CAPSULE, EXTENDED RELEASE ORAL at 09:53

## 2024-07-03 RX ADMIN — Medication 50 MG: at 09:56

## 2024-07-03 RX ADMIN — SODIUM CHLORIDE, PRESERVATIVE FREE 0 ML: 5 INJECTION INTRAVENOUS at 17:37

## 2024-07-03 RX ADMIN — SODIUM CHLORIDE, PRESERVATIVE FREE 0 ML: 5 INJECTION INTRAVENOUS at 09:51

## 2024-07-03 RX ADMIN — Medication 128 MG: at 21:36

## 2024-07-03 RX ADMIN — ASPIRIN 81 MG: 81 TABLET, COATED ORAL at 02:09

## 2024-07-03 RX ADMIN — ALBUMIN (HUMAN) 60 GM: 0.25 INJECTION, SOLUTION INTRAVENOUS at 01:56

## 2024-07-03 RX ADMIN — Medication 125 MCG: at 09:56

## 2024-07-03 RX ADMIN — POTASSIUM CHLORIDE 20 MEQ: 1500 TABLET, EXTENDED RELEASE ORAL at 09:54

## 2024-07-03 RX ADMIN — Medication 128 MG: at 09:53

## 2024-07-04 VITALS
RESPIRATION RATE: 20 BRPM | SYSTOLIC BLOOD PRESSURE: 138 MMHG | OXYGEN SATURATION: 98 % | DIASTOLIC BLOOD PRESSURE: 78 MMHG | TEMPERATURE: 97.34 F | HEART RATE: 68 BPM

## 2024-07-04 VITALS
DIASTOLIC BLOOD PRESSURE: 70 MMHG | OXYGEN SATURATION: 95 % | RESPIRATION RATE: 18 BRPM | TEMPERATURE: 97.88 F | SYSTOLIC BLOOD PRESSURE: 110 MMHG | HEART RATE: 65 BPM

## 2024-07-04 VITALS
TEMPERATURE: 98.4 F | HEART RATE: 85 BPM | OXYGEN SATURATION: 96 % | RESPIRATION RATE: 18 BRPM | SYSTOLIC BLOOD PRESSURE: 136 MMHG | DIASTOLIC BLOOD PRESSURE: 90 MMHG

## 2024-07-04 VITALS — HEART RATE: 85 BPM | OXYGEN SATURATION: 97 % | RESPIRATION RATE: 20 BRPM

## 2024-07-04 VITALS — HEART RATE: 87 BPM | OXYGEN SATURATION: 96 % | RESPIRATION RATE: 24 BRPM

## 2024-07-04 VITALS
SYSTOLIC BLOOD PRESSURE: 107 MMHG | TEMPERATURE: 97.6 F | DIASTOLIC BLOOD PRESSURE: 66 MMHG | RESPIRATION RATE: 20 BRPM | OXYGEN SATURATION: 93 % | HEART RATE: 61 BPM

## 2024-07-04 VITALS
SYSTOLIC BLOOD PRESSURE: 107 MMHG | TEMPERATURE: 97.6 F | HEART RATE: 61 BPM | RESPIRATION RATE: 20 BRPM | DIASTOLIC BLOOD PRESSURE: 66 MMHG | OXYGEN SATURATION: 96 %

## 2024-07-04 VITALS — HEART RATE: 72 BPM

## 2024-07-04 VITALS — HEART RATE: 85 BPM

## 2024-07-04 VITALS
OXYGEN SATURATION: 94 % | HEART RATE: 72 BPM | DIASTOLIC BLOOD PRESSURE: 70 MMHG | RESPIRATION RATE: 18 BRPM | SYSTOLIC BLOOD PRESSURE: 127 MMHG | TEMPERATURE: 97.88 F

## 2024-07-04 VITALS — HEART RATE: 65 BPM | RESPIRATION RATE: 16 BRPM | OXYGEN SATURATION: 94 %

## 2024-07-04 LAB
ANION GAP: 6 (ref 5–15)
BUN SERPL-MCNC: 23 MG/DL (ref 7–18)
BUN/CREAT RATIO: 20 RATIO (ref 10–20)
CALCIUM SERPL-MCNC: 9.6 MG/DL (ref 8.5–10.1)
CARBON DIOXIDE: 32 MMOL/L (ref 21–32)
CHLORIDE: 96 MMOL/L (ref 98–107)
DEPRECATED RDW RBC: 56.9 FL (ref 35.1–43.9)
ERYTHROCYTE [DISTWIDTH] IN BLOOD: 17.2 % (ref 11.6–14.6)
EST GLOM FILT RATE - AFR AMER: 79 ML/MIN (ref 60–?)
ESTIMATED CREATININE CLEARANCE: 77.21 ML/MIN
GLUCOSE: 122 MG/DL (ref 74–106)
HCT VFR BLD AUTO: 36.5 % (ref 40–54)
HEMOGLOBIN: 11.3 G/DL (ref 13–16.5)
HGB BLD-MCNC: 11.3 G/DL (ref 13–16.5)
IMMATURE GRANULOCYTES COUNT: 0.06 X10^3/UL (ref 0–0)
MCV RBC: 89.9 FL (ref 80–94)
MEAN CORP HGB CONC: 31 G/DL (ref 32–36)
MEAN PLATELET VOL.: 10.2 FL (ref 6.2–12)
NRBC FLAGGED BY ANALYZER: 0 % (ref 0–5)
PLATELET # BLD: 214 K/MM3 (ref 150–450)
PLATELET COUNT: 214 K/MM3 (ref 150–450)
POTASSIUM: 4 MMOL/L (ref 3.5–5.1)
PROTHROMBIN TIME (PROTIME)PT.: 20.3 SECONDS (ref 11.7–14.9)
RBC # BLD AUTO: 4.06 M/MM3 (ref 4.6–6.2)
RBC DISTRIBUTION WIDTH CV: 17.2 % (ref 11.6–14.6)
RBC DISTRIBUTION WIDTH SD: 56.9 FL (ref 35.1–43.9)
WBC # BLD AUTO: 11.9 K/MM3 (ref 4.4–11)
WHITE BLOOD COUNT: 11.9 K/MM3 (ref 4.4–11)

## 2024-07-04 RX ADMIN — SODIUM CHLORIDE, PRESERVATIVE FREE 0 ML: 5 INJECTION INTRAVENOUS at 09:09

## 2024-07-04 RX ADMIN — Medication 2 CAP: at 09:08

## 2024-07-04 RX ADMIN — Medication 128 MG: at 09:08

## 2024-07-04 RX ADMIN — Medication 125 MCG: at 09:12

## 2024-07-04 RX ADMIN — POTASSIUM CHLORIDE 20 MEQ: 1500 TABLET, EXTENDED RELEASE ORAL at 09:09

## 2024-07-04 RX ADMIN — POTASSIUM CHLORIDE 20 MEQ: 1500 TABLET, EXTENDED RELEASE ORAL at 16:15

## 2024-07-04 RX ADMIN — Medication 50 MG: at 09:09

## 2024-07-04 RX ADMIN — SODIUM CHLORIDE, PRESERVATIVE FREE 0 ML: 5 INJECTION INTRAVENOUS at 17:43

## 2024-07-04 RX ADMIN — Medication 128 MG: at 21:16

## 2024-07-04 RX ADMIN — TOLTERODINE TARTRATE 4 MG: 4 CAPSULE, EXTENDED RELEASE ORAL at 09:10

## 2024-07-04 RX ADMIN — Medication 2 CAP: at 21:17

## 2024-07-05 VITALS
DIASTOLIC BLOOD PRESSURE: 71 MMHG | HEART RATE: 62 BPM | SYSTOLIC BLOOD PRESSURE: 126 MMHG | OXYGEN SATURATION: 96 % | RESPIRATION RATE: 18 BRPM | TEMPERATURE: 97.52 F

## 2024-07-05 VITALS
DIASTOLIC BLOOD PRESSURE: 89 MMHG | HEART RATE: 73 BPM | SYSTOLIC BLOOD PRESSURE: 120 MMHG | TEMPERATURE: 98.2 F | RESPIRATION RATE: 18 BRPM | OXYGEN SATURATION: 96 %

## 2024-07-05 VITALS — DIASTOLIC BLOOD PRESSURE: 71 MMHG | HEART RATE: 62 BPM | SYSTOLIC BLOOD PRESSURE: 126 MMHG

## 2024-07-05 VITALS — OXYGEN SATURATION: 93 %

## 2024-07-05 VITALS
OXYGEN SATURATION: 94 % | TEMPERATURE: 98 F | HEART RATE: 62 BPM | SYSTOLIC BLOOD PRESSURE: 114 MMHG | RESPIRATION RATE: 18 BRPM | DIASTOLIC BLOOD PRESSURE: 62 MMHG

## 2024-07-05 VITALS — HEART RATE: 62 BPM

## 2024-07-05 VITALS
OXYGEN SATURATION: 94 % | HEART RATE: 60 BPM | DIASTOLIC BLOOD PRESSURE: 87 MMHG | TEMPERATURE: 97 F | RESPIRATION RATE: 16 BRPM | SYSTOLIC BLOOD PRESSURE: 104 MMHG

## 2024-07-05 VITALS — OXYGEN SATURATION: 97 %

## 2024-07-05 VITALS — OXYGEN SATURATION: 94 %

## 2024-07-05 VITALS — HEART RATE: 56 BPM | RESPIRATION RATE: 18 BRPM | OXYGEN SATURATION: 96 %

## 2024-07-05 LAB
ANION GAP: 4 (ref 5–15)
BUN SERPL-MCNC: 30 MG/DL (ref 7–18)
BUN/CREAT RATIO: 28.3 RATIO (ref 10–20)
CALCIUM SERPL-MCNC: 9.5 MG/DL (ref 8.5–10.1)
CARBON DIOXIDE: 31 MMOL/L (ref 21–32)
CHLORIDE: 100 MMOL/L (ref 98–107)
EST GLOM FILT RATE - AFR AMER: 87 ML/MIN (ref 60–?)
ESTIMATED CREATININE CLEARANCE: 83.77 ML/MIN
GLUCOSE: 95 MG/DL (ref 74–106)
POTASSIUM: 4.4 MMOL/L (ref 3.5–5.1)
PROTHROMBIN TIME (PROTIME)PT.: 21.1 SECONDS (ref 11.7–14.9)

## 2024-07-05 RX ADMIN — Medication 2 CAP: at 20:54

## 2024-07-05 RX ADMIN — POTASSIUM CHLORIDE 20 MEQ: 1500 TABLET, EXTENDED RELEASE ORAL at 17:14

## 2024-07-05 RX ADMIN — POTASSIUM CHLORIDE 20 MEQ: 1500 TABLET, EXTENDED RELEASE ORAL at 10:01

## 2024-07-05 RX ADMIN — Medication 128 MG: at 10:01

## 2024-07-05 RX ADMIN — TOLTERODINE TARTRATE 4 MG: 4 CAPSULE, EXTENDED RELEASE ORAL at 10:01

## 2024-07-05 RX ADMIN — Medication 125 MCG: at 10:05

## 2024-07-05 RX ADMIN — Medication 50 MG: at 10:05

## 2024-07-05 RX ADMIN — Medication 2 CAP: at 10:01

## 2024-07-05 RX ADMIN — Medication 128 MG: at 20:53

## 2024-07-06 VITALS
TEMPERATURE: 98.6 F | DIASTOLIC BLOOD PRESSURE: 75 MMHG | RESPIRATION RATE: 18 BRPM | SYSTOLIC BLOOD PRESSURE: 131 MMHG | HEART RATE: 52 BPM | OXYGEN SATURATION: 95 %

## 2024-07-06 VITALS
DIASTOLIC BLOOD PRESSURE: 61 MMHG | SYSTOLIC BLOOD PRESSURE: 103 MMHG | TEMPERATURE: 97.88 F | RESPIRATION RATE: 17 BRPM | OXYGEN SATURATION: 94 % | HEART RATE: 60 BPM

## 2024-07-06 VITALS — OXYGEN SATURATION: 95 %

## 2024-07-06 VITALS
DIASTOLIC BLOOD PRESSURE: 64 MMHG | OXYGEN SATURATION: 94 % | RESPIRATION RATE: 17 BRPM | TEMPERATURE: 98.06 F | HEART RATE: 72 BPM | SYSTOLIC BLOOD PRESSURE: 115 MMHG

## 2024-07-06 VITALS — HEART RATE: 67 BPM

## 2024-07-06 LAB
ANION GAP: 3 (ref 5–15)
BUN SERPL-MCNC: 34 MG/DL (ref 7–18)
BUN/CREAT RATIO: 31.5 RATIO (ref 10–20)
CALCIUM SERPL-MCNC: 9.6 MG/DL (ref 8.5–10.1)
CARBON DIOXIDE: 32 MMOL/L (ref 21–32)
CHLORIDE: 100 MMOL/L (ref 98–107)
DEPRECATED RDW RBC: 58.4 FL (ref 35.1–43.9)
ERYTHROCYTE [DISTWIDTH] IN BLOOD: 17.2 % (ref 11.6–14.6)
EST GLOM FILT RATE - AFR AMER: 85 ML/MIN (ref 60–?)
ESTIMATED CREATININE CLEARANCE: 80.56 ML/MIN
GLUCOSE: 81 MG/DL (ref 74–106)
HCT VFR BLD AUTO: 38.9 % (ref 40–54)
HEMOGLOBIN: 11.6 G/DL (ref 13–16.5)
HGB BLD-MCNC: 11.6 G/DL (ref 13–16.5)
IMMATURE GRANULOCYTES COUNT: 0.05 X10^3/UL (ref 0–0)
MCV RBC: 91.1 FL (ref 80–94)
MEAN CORP HGB CONC: 29.8 G/DL (ref 32–36)
MEAN PLATELET VOL.: 10.1 FL (ref 6.2–12)
NRBC FLAGGED BY ANALYZER: 0 % (ref 0–5)
PLATELET # BLD: 219 K/MM3 (ref 150–450)
PLATELET COUNT: 219 K/MM3 (ref 150–450)
POTASSIUM: 4.5 MMOL/L (ref 3.5–5.1)
PROTHROMBIN TIME (PROTIME)PT.: 19.9 SECONDS (ref 11.7–14.9)
RBC # BLD AUTO: 4.27 M/MM3 (ref 4.6–6.2)
RBC DISTRIBUTION WIDTH CV: 17.2 % (ref 11.6–14.6)
RBC DISTRIBUTION WIDTH SD: 58.4 FL (ref 35.1–43.9)
WBC # BLD AUTO: 10.7 K/MM3 (ref 4.4–11)
WHITE BLOOD COUNT: 10.7 K/MM3 (ref 4.4–11)

## 2024-07-06 RX ADMIN — Medication 125 MCG: at 09:37

## 2024-07-06 RX ADMIN — Medication 50 MG: at 09:36

## 2024-07-06 RX ADMIN — POTASSIUM CHLORIDE 20 MEQ: 1500 TABLET, EXTENDED RELEASE ORAL at 09:35

## 2024-07-06 RX ADMIN — Medication 2 CAP: at 09:37

## 2024-07-06 RX ADMIN — Medication 128 MG: at 09:36

## 2024-07-06 RX ADMIN — TOLTERODINE TARTRATE 4 MG: 4 CAPSULE, EXTENDED RELEASE ORAL at 09:38

## 2024-07-06 RX ADMIN — HYDROCODONE BITARTRATE AND ACETAMINOPHEN 1 TABLET: 5; 325 TABLET ORAL at 05:06

## 2024-07-06 RX ADMIN — SODIUM CHLORIDE, PRESERVATIVE FREE 0 ML: 5 INJECTION INTRAVENOUS at 10:32

## 2024-07-06 RX ADMIN — SODIUM CHLORIDE, PRESERVATIVE FREE 0 ML: 5 INJECTION INTRAVENOUS at 09:42

## 2024-12-31 ENCOUNTER — HOSPITAL ENCOUNTER (EMERGENCY)
Age: 79
LOS: 1 days | Discharge: HOME | End: 2025-01-01
Payer: MEDICARE

## 2024-12-31 VITALS
HEART RATE: 79 BPM | RESPIRATION RATE: 20 BRPM | SYSTOLIC BLOOD PRESSURE: 126 MMHG | OXYGEN SATURATION: 92 % | DIASTOLIC BLOOD PRESSURE: 84 MMHG | TEMPERATURE: 98.4 F

## 2024-12-31 VITALS — RESPIRATION RATE: 20 BRPM | HEART RATE: 72 BPM

## 2024-12-31 VITALS
HEART RATE: 71 BPM | SYSTOLIC BLOOD PRESSURE: 126 MMHG | DIASTOLIC BLOOD PRESSURE: 84 MMHG | RESPIRATION RATE: 20 BRPM | OXYGEN SATURATION: 90 % | TEMPERATURE: 98.4 F

## 2024-12-31 VITALS — OXYGEN SATURATION: 97 %

## 2024-12-31 VITALS — BODY MASS INDEX: 36.1 KG/M2

## 2024-12-31 DIAGNOSIS — J06.9: Primary | ICD-10-CM

## 2024-12-31 DIAGNOSIS — I50.9: ICD-10-CM

## 2024-12-31 DIAGNOSIS — Z85.038: ICD-10-CM

## 2024-12-31 DIAGNOSIS — Z79.01: ICD-10-CM

## 2024-12-31 DIAGNOSIS — I48.19: ICD-10-CM

## 2024-12-31 DIAGNOSIS — J44.9: ICD-10-CM

## 2024-12-31 DIAGNOSIS — E78.5: ICD-10-CM

## 2024-12-31 DIAGNOSIS — Z95.810: ICD-10-CM

## 2024-12-31 DIAGNOSIS — I11.0: ICD-10-CM

## 2024-12-31 DIAGNOSIS — F17.210: ICD-10-CM

## 2024-12-31 DIAGNOSIS — R05.9: ICD-10-CM

## 2024-12-31 DIAGNOSIS — Z85.46: ICD-10-CM

## 2024-12-31 DIAGNOSIS — I42.0: ICD-10-CM

## 2024-12-31 LAB
ANION GAP: 4 (ref 5–15)
BUN SERPL-MCNC: 17 MG/DL (ref 7–18)
BUN/CREAT RATIO: 11.2 RATIO (ref 10–20)
CALCIUM SERPL-MCNC: 9.5 MG/DL (ref 8.5–10.1)
CARBON DIOXIDE: 32 MMOL/L (ref 21–32)
CHLORIDE: 99 MMOL/L (ref 98–107)
DEPRECATED RDW RBC: 54.9 FL (ref 35.1–43.9)
ERYTHROCYTE [DISTWIDTH] IN BLOOD: 16.6 % (ref 11.6–14.6)
EST GLOM FILT RATE - AFR AMER: 57 ML/MIN (ref 60–?)
ESTIMATED CREATININE CLEARANCE: 56.86 ML/MIN
GLUCOSE: 115 MG/DL (ref 74–106)
HCT VFR BLD AUTO: 41.7 % (ref 40–54)
HEMOGLOBIN: 12.9 G/DL (ref 13–16.5)
HGB BLD-MCNC: 12.9 G/DL (ref 13–16.5)
IMMATURE GRANULOCYTES COUNT: 0.05 X10^3/UL (ref 0–0)
MAGNESIUM: 2.1 MG/DL (ref 1.6–2.6)
MCV RBC: 90.3 FL (ref 80–94)
MEAN CORP HGB CONC: 30.9 G/DL (ref 32–36)
MEAN PLATELET VOL.: 10.4 FL (ref 6.2–12)
NRBC FLAGGED BY ANALYZER: 0 % (ref 0–5)
PLATELET # BLD: 213 K/MM3 (ref 150–450)
PLATELET COUNT: 213 K/MM3 (ref 150–450)
POTASSIUM: 4.3 MMOL/L (ref 3.5–5.1)
RBC # BLD AUTO: 4.62 M/MM3 (ref 4.6–6.2)
RBC DISTRIBUTION WIDTH CV: 16.6 % (ref 11.6–14.6)
RBC DISTRIBUTION WIDTH SD: 54.9 FL (ref 35.1–43.9)
WBC # BLD AUTO: 9.6 K/MM3 (ref 4.4–11)
WHITE BLOOD COUNT: 9.6 K/MM3 (ref 4.4–11)

## 2024-12-31 PROCEDURE — 83735 ASSAY OF MAGNESIUM: CPT

## 2024-12-31 PROCEDURE — 94640 AIRWAY INHALATION TREATMENT: CPT

## 2024-12-31 PROCEDURE — 83880 ASSAY OF NATRIURETIC PEPTIDE: CPT

## 2024-12-31 PROCEDURE — 85025 COMPLETE CBC W/AUTO DIFF WBC: CPT

## 2024-12-31 PROCEDURE — 96374 THER/PROPH/DIAG INJ IV PUSH: CPT

## 2024-12-31 PROCEDURE — A4216 STERILE WATER/SALINE, 10 ML: HCPCS

## 2024-12-31 PROCEDURE — 71045 X-RAY EXAM CHEST 1 VIEW: CPT

## 2024-12-31 PROCEDURE — 87631 RESP VIRUS 3-5 TARGETS: CPT

## 2024-12-31 PROCEDURE — 93005 ELECTROCARDIOGRAM TRACING: CPT

## 2024-12-31 PROCEDURE — 99285 EMERGENCY DEPT VISIT HI MDM: CPT

## 2024-12-31 PROCEDURE — 80048 BASIC METABOLIC PNL TOTAL CA: CPT

## 2025-01-01 VITALS
OXYGEN SATURATION: 95 % | TEMPERATURE: 98.4 F | RESPIRATION RATE: 19 BRPM | HEART RATE: 68 BPM | DIASTOLIC BLOOD PRESSURE: 60 MMHG | SYSTOLIC BLOOD PRESSURE: 119 MMHG

## 2025-01-01 VITALS
RESPIRATION RATE: 18 BRPM | HEART RATE: 89 BPM | DIASTOLIC BLOOD PRESSURE: 62 MMHG | SYSTOLIC BLOOD PRESSURE: 122 MMHG | OXYGEN SATURATION: 94 %

## 2025-01-01 VITALS
HEART RATE: 63 BPM | SYSTOLIC BLOOD PRESSURE: 103 MMHG | RESPIRATION RATE: 14 BRPM | OXYGEN SATURATION: 98 % | TEMPERATURE: 97.88 F | DIASTOLIC BLOOD PRESSURE: 69 MMHG

## 2025-01-01 VITALS
OXYGEN SATURATION: 92 % | TEMPERATURE: 98.7 F | HEART RATE: 66 BPM | DIASTOLIC BLOOD PRESSURE: 79 MMHG | RESPIRATION RATE: 18 BRPM | SYSTOLIC BLOOD PRESSURE: 123 MMHG

## 2025-01-01 LAB — BNP,B-TYPE NATRIURETIC PEPTIDE: 119.7 PG/ML (ref 0–100)

## 2025-01-13 ENCOUNTER — HOSPITAL ENCOUNTER (OUTPATIENT)
Dept: HOSPITAL 100 - LABSPEC | Age: 80
Discharge: HOME | End: 2025-01-13
Payer: MEDICARE

## 2025-01-13 DIAGNOSIS — Z46.6: Primary | ICD-10-CM

## 2025-02-05 ENCOUNTER — HOSPITAL ENCOUNTER (INPATIENT)
Dept: HOSPITAL 100 - ED | Age: 80
LOS: 5 days | Discharge: HOME HEALTH | DRG: 291 | End: 2025-02-10
Payer: MEDICARE

## 2025-02-05 VITALS
OXYGEN SATURATION: 93 % | HEART RATE: 63 BPM | RESPIRATION RATE: 28 BRPM | SYSTOLIC BLOOD PRESSURE: 104 MMHG | DIASTOLIC BLOOD PRESSURE: 55 MMHG

## 2025-02-05 VITALS
BODY MASS INDEX: 36.2 KG/M2 | BODY MASS INDEX: 36.9 KG/M2 | BODY MASS INDEX: 35.9 KG/M2 | BODY MASS INDEX: 35.4 KG/M2 | BODY MASS INDEX: 35.5 KG/M2 | BODY MASS INDEX: 35.6 KG/M2

## 2025-02-05 VITALS
OXYGEN SATURATION: 86 % | RESPIRATION RATE: 21 BRPM | HEART RATE: 73 BPM | SYSTOLIC BLOOD PRESSURE: 99 MMHG | DIASTOLIC BLOOD PRESSURE: 58 MMHG

## 2025-02-05 VITALS — OXYGEN SATURATION: 93 % | RESPIRATION RATE: 26 BRPM | HEART RATE: 76 BPM

## 2025-02-05 VITALS
HEART RATE: 78 BPM | SYSTOLIC BLOOD PRESSURE: 119 MMHG | RESPIRATION RATE: 25 BRPM | DIASTOLIC BLOOD PRESSURE: 76 MMHG | OXYGEN SATURATION: 99 %

## 2025-02-05 VITALS
HEART RATE: 87 BPM | TEMPERATURE: 97.34 F | OXYGEN SATURATION: 91 % | DIASTOLIC BLOOD PRESSURE: 75 MMHG | SYSTOLIC BLOOD PRESSURE: 136 MMHG | RESPIRATION RATE: 22 BRPM

## 2025-02-05 VITALS
RESPIRATION RATE: 24 BRPM | SYSTOLIC BLOOD PRESSURE: 113 MMHG | HEART RATE: 63 BPM | DIASTOLIC BLOOD PRESSURE: 64 MMHG | OXYGEN SATURATION: 97 %

## 2025-02-05 VITALS
TEMPERATURE: 97.3 F | RESPIRATION RATE: 19 BRPM | DIASTOLIC BLOOD PRESSURE: 62 MMHG | SYSTOLIC BLOOD PRESSURE: 124 MMHG | HEART RATE: 80 BPM | OXYGEN SATURATION: 96 %

## 2025-02-05 VITALS
DIASTOLIC BLOOD PRESSURE: 73 MMHG | OXYGEN SATURATION: 98 % | SYSTOLIC BLOOD PRESSURE: 132 MMHG | HEART RATE: 76 BPM | TEMPERATURE: 97.34 F | RESPIRATION RATE: 20 BRPM

## 2025-02-05 VITALS
SYSTOLIC BLOOD PRESSURE: 116 MMHG | RESPIRATION RATE: 21 BRPM | OXYGEN SATURATION: 100 % | HEART RATE: 72 BPM | DIASTOLIC BLOOD PRESSURE: 69 MMHG

## 2025-02-05 VITALS
HEART RATE: 77 BPM | DIASTOLIC BLOOD PRESSURE: 70 MMHG | RESPIRATION RATE: 16 BRPM | TEMPERATURE: 97.2 F | OXYGEN SATURATION: 100 % | SYSTOLIC BLOOD PRESSURE: 113 MMHG

## 2025-02-05 VITALS
TEMPERATURE: 97.34 F | OXYGEN SATURATION: 93 % | DIASTOLIC BLOOD PRESSURE: 71 MMHG | SYSTOLIC BLOOD PRESSURE: 125 MMHG | HEART RATE: 83 BPM | RESPIRATION RATE: 20 BRPM

## 2025-02-05 VITALS — OXYGEN SATURATION: 95 % | RESPIRATION RATE: 12 BRPM | HEART RATE: 77 BPM

## 2025-02-05 VITALS
HEART RATE: 88 BPM | RESPIRATION RATE: 26 BRPM | OXYGEN SATURATION: 94 % | SYSTOLIC BLOOD PRESSURE: 120 MMHG | DIASTOLIC BLOOD PRESSURE: 82 MMHG | TEMPERATURE: 97.88 F

## 2025-02-05 VITALS
SYSTOLIC BLOOD PRESSURE: 115 MMHG | DIASTOLIC BLOOD PRESSURE: 66 MMHG | OXYGEN SATURATION: 97 % | HEART RATE: 78 BPM | RESPIRATION RATE: 24 BRPM

## 2025-02-05 VITALS
DIASTOLIC BLOOD PRESSURE: 73 MMHG | HEART RATE: 72 BPM | SYSTOLIC BLOOD PRESSURE: 117 MMHG | RESPIRATION RATE: 23 BRPM | OXYGEN SATURATION: 100 %

## 2025-02-05 VITALS
OXYGEN SATURATION: 94 % | DIASTOLIC BLOOD PRESSURE: 64 MMHG | HEART RATE: 66 BPM | SYSTOLIC BLOOD PRESSURE: 107 MMHG | RESPIRATION RATE: 25 BRPM

## 2025-02-05 VITALS
OXYGEN SATURATION: 91 % | RESPIRATION RATE: 26 BRPM | DIASTOLIC BLOOD PRESSURE: 59 MMHG | HEART RATE: 77 BPM | SYSTOLIC BLOOD PRESSURE: 103 MMHG

## 2025-02-05 VITALS — HEART RATE: 81 BPM | RESPIRATION RATE: 20 BRPM

## 2025-02-05 VITALS
SYSTOLIC BLOOD PRESSURE: 132 MMHG | HEART RATE: 76 BPM | OXYGEN SATURATION: 98 % | DIASTOLIC BLOOD PRESSURE: 73 MMHG | RESPIRATION RATE: 23 BRPM | TEMPERATURE: 97.34 F

## 2025-02-05 VITALS — OXYGEN SATURATION: 94 % | HEART RATE: 73 BPM | RESPIRATION RATE: 15 BRPM

## 2025-02-05 VITALS — HEART RATE: 70 BPM | RESPIRATION RATE: 14 BRPM | OXYGEN SATURATION: 97 %

## 2025-02-05 VITALS
RESPIRATION RATE: 20 BRPM | DIASTOLIC BLOOD PRESSURE: 65 MMHG | TEMPERATURE: 97.52 F | HEART RATE: 82 BPM | OXYGEN SATURATION: 94 % | SYSTOLIC BLOOD PRESSURE: 119 MMHG

## 2025-02-05 VITALS — SYSTOLIC BLOOD PRESSURE: 103 MMHG | DIASTOLIC BLOOD PRESSURE: 59 MMHG | HEART RATE: 92 BPM

## 2025-02-05 VITALS — RESPIRATION RATE: 14 BRPM | OXYGEN SATURATION: 92 % | HEART RATE: 70 BPM

## 2025-02-05 VITALS — OXYGEN SATURATION: 92 %

## 2025-02-05 VITALS — RESPIRATION RATE: 24 BRPM | HEART RATE: 81 BPM

## 2025-02-05 DIAGNOSIS — Z95.810: ICD-10-CM

## 2025-02-05 DIAGNOSIS — J10.1: ICD-10-CM

## 2025-02-05 DIAGNOSIS — I48.19: ICD-10-CM

## 2025-02-05 DIAGNOSIS — K21.9: ICD-10-CM

## 2025-02-05 DIAGNOSIS — Z79.01: ICD-10-CM

## 2025-02-05 DIAGNOSIS — I50.33: ICD-10-CM

## 2025-02-05 DIAGNOSIS — L03.115: ICD-10-CM

## 2025-02-05 DIAGNOSIS — J44.1: ICD-10-CM

## 2025-02-05 DIAGNOSIS — J96.21: ICD-10-CM

## 2025-02-05 DIAGNOSIS — Z85.038: ICD-10-CM

## 2025-02-05 DIAGNOSIS — J96.22: ICD-10-CM

## 2025-02-05 DIAGNOSIS — R53.81: ICD-10-CM

## 2025-02-05 DIAGNOSIS — Z99.89: ICD-10-CM

## 2025-02-05 DIAGNOSIS — G47.33: ICD-10-CM

## 2025-02-05 DIAGNOSIS — L03.116: ICD-10-CM

## 2025-02-05 DIAGNOSIS — E78.5: ICD-10-CM

## 2025-02-05 DIAGNOSIS — Z79.899: ICD-10-CM

## 2025-02-05 DIAGNOSIS — N32.81: ICD-10-CM

## 2025-02-05 DIAGNOSIS — E66.812: ICD-10-CM

## 2025-02-05 DIAGNOSIS — Z85.46: ICD-10-CM

## 2025-02-05 DIAGNOSIS — I11.0: Primary | ICD-10-CM

## 2025-02-05 DIAGNOSIS — F32.A: ICD-10-CM

## 2025-02-05 DIAGNOSIS — F17.200: ICD-10-CM

## 2025-02-05 LAB
ALANINE AMINOTRANSFER ALT/SGPT: 14 U/L (ref 16–61)
ALBUMIN SERPL-MCNC: 3.2 G/DL (ref 3.2–5)
ALKALINE PHOSPHATASE: 75 U/L (ref 45–117)
ANION GAP: 4 (ref 5–15)
APTT PPP: 43.8 SECONDS (ref 24.1–36.2)
AST(SGOT): 22 U/L (ref 15–37)
BASE EXCESS BLDV CALC-SCNC: 5 MMOL/L
BASE EXCESS BLDV CALC-SCNC: 8 MMOL/L
BNP,B-TYPE NATRIURETIC PEPTIDE: 270 PG/ML (ref 0–100)
BUN SERPL-MCNC: 27 MG/DL (ref 7–18)
BUN/CREAT RATIO: 23.5 RATIO (ref 10–20)
CALCIUM SERPL-MCNC: 9 MG/DL (ref 8.5–10.1)
CARBON DIOXIDE: 33 MMOL/L (ref 21–32)
CHLORIDE: 98 MMOL/L (ref 98–107)
CREAT SERPL-MCNC: 1.15 MG/DL (ref 0.7–1.3)
DEPRECATED RDW RBC: 61 FL (ref 35.1–43.9)
ERYTHROCYTE [DISTWIDTH] IN BLOOD: 18.1 % (ref 11.6–14.6)
EST GLOM FILT RATE - AFR AMER: 79 ML/MIN (ref 60–?)
ESTIMATED CREATININE CLEARANCE: 76.06 ML/MIN
FI02: 40
FI02: 50
GLOBULIN: 4.1 G/DL (ref 2.2–4.2)
GLUCOSE: 108 MG/DL (ref 74–106)
HCT VFR BLD AUTO: 37.3 % (ref 40–54)
HEMOGLOBIN: 11.2 G/DL (ref 13–16.5)
HGB BLD-MCNC: 11.2 G/DL (ref 13–16.5)
IMMATURE GRANULOCYTES COUNT: 0.05 X10^3/UL (ref 0–0)
LEUKOCYTE ESTERASE UR QL STRIP: 500 /UL
MAGNESIUM: 2.2 MG/DL (ref 1.6–2.6)
MCV RBC: 92.3 FL (ref 80–94)
MEAN CORP HGB CONC: 30 G/DL (ref 32–36)
MEAN PLATELET VOL.: 10.4 FL (ref 6.2–12)
MUCOUS THREADS URNS QL MICRO: (no result) /HPF
NRBC FLAGGED BY ANALYZER: 0 % (ref 0–5)
PEEP: 6
PEEP: 6
PLATELET # BLD: 184 K/MM3 (ref 150–450)
PLATELET COUNT: 184 K/MM3 (ref 150–450)
PO2 BLDA: 68 MMHG (ref 75–100)
PO2 BLDA: 92 MMHG (ref 75–100)
POTASSIUM: 4.2 MMOL/L (ref 3.5–5.1)
PROCALCITONIN SERPL-MCNC: 0.15 NG/ML (ref 0–0.09)
PROT UR QL STRIP.AUTO: 30 MG/DL
PROTHROMBIN TIME (PROTIME)PT.: 24.1 SECONDS (ref 11.7–14.9)
RBC # BLD AUTO: 4.04 M/MM3 (ref 4.6–6.2)
RBC DISTRIBUTION WIDTH CV: 18.1 % (ref 11.6–14.6)
RBC DISTRIBUTION WIDTH SD: 61 FL (ref 35.1–43.9)
RBC UR QL: (no result) /HPF (ref 0–5)
RBC UR QL: 250 /UL
SO2: 89 % (ref 95–99)
SO2: 95 % (ref 95–99)
SP GR UR: 1.01 (ref 1–1.03)
SQUAMOUS URNS QL MICRO: (no result) /HPF (ref 0–5)
TROPONIN-I HS: 43 PG/ML (ref 3–78)
URINE PRESERVATIVE: (no result)
WBC # BLD AUTO: 5.1 K/MM3 (ref 4.4–11)
WHITE BLOOD COUNT: 5.1 K/MM3 (ref 4.4–11)

## 2025-02-05 PROCEDURE — 85025 COMPLETE CBC W/AUTO DIFF WBC: CPT

## 2025-02-05 PROCEDURE — 36600 WITHDRAWAL OF ARTERIAL BLOOD: CPT

## 2025-02-05 PROCEDURE — 84484 ASSAY OF TROPONIN QUANT: CPT

## 2025-02-05 PROCEDURE — 87631 RESP VIRUS 3-5 TARGETS: CPT

## 2025-02-05 PROCEDURE — 87205 SMEAR GRAM STAIN: CPT

## 2025-02-05 PROCEDURE — 94003 VENT MGMT INPAT SUBQ DAY: CPT

## 2025-02-05 PROCEDURE — 85610 PROTHROMBIN TIME: CPT

## 2025-02-05 PROCEDURE — 87641 MR-STAPH DNA AMP PROBE: CPT

## 2025-02-05 PROCEDURE — 87077 CULTURE AEROBIC IDENTIFY: CPT

## 2025-02-05 PROCEDURE — 84443 ASSAY THYROID STIM HORMONE: CPT

## 2025-02-05 PROCEDURE — 80048 BASIC METABOLIC PNL TOTAL CA: CPT

## 2025-02-05 PROCEDURE — 97162 PT EVAL MOD COMPLEX 30 MIN: CPT

## 2025-02-05 PROCEDURE — 84100 ASSAY OF PHOSPHORUS: CPT

## 2025-02-05 PROCEDURE — 94002 VENT MGMT INPAT INIT DAY: CPT

## 2025-02-05 PROCEDURE — 83735 ASSAY OF MAGNESIUM: CPT

## 2025-02-05 PROCEDURE — 36415 COLL VENOUS BLD VENIPUNCTURE: CPT

## 2025-02-05 PROCEDURE — 83605 ASSAY OF LACTIC ACID: CPT

## 2025-02-05 PROCEDURE — 99285 EMERGENCY DEPT VISIT HI MDM: CPT

## 2025-02-05 PROCEDURE — 80061 LIPID PANEL: CPT

## 2025-02-05 PROCEDURE — 87186 SC STD MICRODIL/AGAR DIL: CPT

## 2025-02-05 PROCEDURE — 87086 URINE CULTURE/COLONY COUNT: CPT

## 2025-02-05 PROCEDURE — 82803 BLOOD GASES ANY COMBINATION: CPT

## 2025-02-05 PROCEDURE — 71045 X-RAY EXAM CHEST 1 VIEW: CPT

## 2025-02-05 PROCEDURE — 94762 N-INVAS EAR/PLS OXIMTRY CONT: CPT

## 2025-02-05 PROCEDURE — 97530 THERAPEUTIC ACTIVITIES: CPT

## 2025-02-05 PROCEDURE — 94668 MNPJ CHEST WALL SBSQ: CPT

## 2025-02-05 PROCEDURE — 97802 MEDICAL NUTRITION INDIV IN: CPT

## 2025-02-05 PROCEDURE — 97166 OT EVAL MOD COMPLEX 45 MIN: CPT

## 2025-02-05 PROCEDURE — 84145 PROCALCITONIN (PCT): CPT

## 2025-02-05 PROCEDURE — 83880 ASSAY OF NATRIURETIC PEPTIDE: CPT

## 2025-02-05 PROCEDURE — 93306 TTE W/DOPPLER COMPLETE: CPT

## 2025-02-05 PROCEDURE — A4216 STERILE WATER/SALINE, 10 ML: HCPCS

## 2025-02-05 PROCEDURE — 80053 COMPREHEN METABOLIC PANEL: CPT

## 2025-02-05 PROCEDURE — 85027 COMPLETE CBC AUTOMATED: CPT

## 2025-02-05 PROCEDURE — C8929 TTE W OR WO FOL WCON,DOPPLER: HCPCS

## 2025-02-05 PROCEDURE — 87070 CULTURE OTHR SPECIMN AEROBIC: CPT

## 2025-02-05 PROCEDURE — 85730 THROMBOPLASTIN TIME PARTIAL: CPT

## 2025-02-05 PROCEDURE — 97535 SELF CARE MNGMENT TRAINING: CPT

## 2025-02-05 PROCEDURE — 81001 URINALYSIS AUTO W/SCOPE: CPT

## 2025-02-05 PROCEDURE — 74019 RADEX ABDOMEN 2 VIEWS: CPT

## 2025-02-05 PROCEDURE — 94640 AIRWAY INHALATION TREATMENT: CPT

## 2025-02-05 PROCEDURE — 87184 SC STD DISK METHOD PER PLATE: CPT

## 2025-02-05 PROCEDURE — 97116 GAIT TRAINING THERAPY: CPT

## 2025-02-05 PROCEDURE — 87088 URINE BACTERIA CULTURE: CPT

## 2025-02-05 PROCEDURE — 87040 BLOOD CULTURE FOR BACTERIA: CPT

## 2025-02-05 PROCEDURE — 93005 ELECTROCARDIOGRAM TRACING: CPT

## 2025-02-05 RX ADMIN — CEFEPIME 200 GM: 2 INJECTION, POWDER, FOR SOLUTION INTRAVENOUS at 19:51

## 2025-02-05 RX ADMIN — WARFARIN SODIUM 2 MG: 2 TABLET ORAL at 17:18

## 2025-02-05 RX ADMIN — DOCUSATE SODIUM 50 MG AND SENNOSIDES 8.6 MG 2 TABLET: 8.6; 5 TABLET, FILM COATED ORAL at 19:50

## 2025-02-05 RX ADMIN — CEFEPIME 200 GM: 2 INJECTION, POWDER, FOR SOLUTION INTRAVENOUS at 14:22

## 2025-02-06 VITALS
SYSTOLIC BLOOD PRESSURE: 109 MMHG | HEART RATE: 79 BPM | DIASTOLIC BLOOD PRESSURE: 62 MMHG | RESPIRATION RATE: 16 BRPM | OXYGEN SATURATION: 90 %

## 2025-02-06 VITALS
HEART RATE: 53 BPM | SYSTOLIC BLOOD PRESSURE: 101 MMHG | RESPIRATION RATE: 18 BRPM | DIASTOLIC BLOOD PRESSURE: 54 MMHG | OXYGEN SATURATION: 93 %

## 2025-02-06 VITALS
SYSTOLIC BLOOD PRESSURE: 101 MMHG | HEART RATE: 56 BPM | RESPIRATION RATE: 24 BRPM | DIASTOLIC BLOOD PRESSURE: 50 MMHG | OXYGEN SATURATION: 94 %

## 2025-02-06 VITALS
OXYGEN SATURATION: 90 % | HEART RATE: 56 BPM | TEMPERATURE: 96.8 F | RESPIRATION RATE: 17 BRPM | DIASTOLIC BLOOD PRESSURE: 53 MMHG | SYSTOLIC BLOOD PRESSURE: 98 MMHG

## 2025-02-06 VITALS
HEART RATE: 75 BPM | RESPIRATION RATE: 22 BRPM | DIASTOLIC BLOOD PRESSURE: 87 MMHG | SYSTOLIC BLOOD PRESSURE: 128 MMHG | OXYGEN SATURATION: 100 %

## 2025-02-06 VITALS
OXYGEN SATURATION: 93 % | DIASTOLIC BLOOD PRESSURE: 57 MMHG | RESPIRATION RATE: 21 BRPM | HEART RATE: 65 BPM | SYSTOLIC BLOOD PRESSURE: 98 MMHG | TEMPERATURE: 98.24 F

## 2025-02-06 VITALS
DIASTOLIC BLOOD PRESSURE: 67 MMHG | HEART RATE: 67 BPM | OXYGEN SATURATION: 98 % | RESPIRATION RATE: 19 BRPM | SYSTOLIC BLOOD PRESSURE: 119 MMHG

## 2025-02-06 VITALS
HEART RATE: 70 BPM | DIASTOLIC BLOOD PRESSURE: 54 MMHG | SYSTOLIC BLOOD PRESSURE: 108 MMHG | OXYGEN SATURATION: 95 % | RESPIRATION RATE: 17 BRPM

## 2025-02-06 VITALS
RESPIRATION RATE: 14 BRPM | OXYGEN SATURATION: 96 % | SYSTOLIC BLOOD PRESSURE: 115 MMHG | DIASTOLIC BLOOD PRESSURE: 79 MMHG | HEART RATE: 80 BPM

## 2025-02-06 VITALS
RESPIRATION RATE: 20 BRPM | SYSTOLIC BLOOD PRESSURE: 96 MMHG | DIASTOLIC BLOOD PRESSURE: 54 MMHG | OXYGEN SATURATION: 94 % | HEART RATE: 55 BPM

## 2025-02-06 VITALS
OXYGEN SATURATION: 95 % | SYSTOLIC BLOOD PRESSURE: 105 MMHG | RESPIRATION RATE: 17 BRPM | HEART RATE: 49 BPM | DIASTOLIC BLOOD PRESSURE: 51 MMHG

## 2025-02-06 VITALS
OXYGEN SATURATION: 91 % | RESPIRATION RATE: 19 BRPM | DIASTOLIC BLOOD PRESSURE: 61 MMHG | HEART RATE: 61 BPM | SYSTOLIC BLOOD PRESSURE: 111 MMHG

## 2025-02-06 VITALS
RESPIRATION RATE: 20 BRPM | SYSTOLIC BLOOD PRESSURE: 108 MMHG | HEART RATE: 78 BPM | DIASTOLIC BLOOD PRESSURE: 57 MMHG | OXYGEN SATURATION: 97 %

## 2025-02-06 VITALS
RESPIRATION RATE: 20 BRPM | HEART RATE: 97 BPM | SYSTOLIC BLOOD PRESSURE: 110 MMHG | OXYGEN SATURATION: 96 % | DIASTOLIC BLOOD PRESSURE: 68 MMHG

## 2025-02-06 VITALS — HEART RATE: 69 BPM | RESPIRATION RATE: 17 BRPM

## 2025-02-06 VITALS
DIASTOLIC BLOOD PRESSURE: 60 MMHG | SYSTOLIC BLOOD PRESSURE: 120 MMHG | OXYGEN SATURATION: 94 % | HEART RATE: 70 BPM | RESPIRATION RATE: 20 BRPM

## 2025-02-06 VITALS
OXYGEN SATURATION: 94 % | TEMPERATURE: 97.5 F | RESPIRATION RATE: 18 BRPM | SYSTOLIC BLOOD PRESSURE: 113 MMHG | DIASTOLIC BLOOD PRESSURE: 57 MMHG | HEART RATE: 72 BPM

## 2025-02-06 VITALS
TEMPERATURE: 97.88 F | OXYGEN SATURATION: 96 % | HEART RATE: 92 BPM | SYSTOLIC BLOOD PRESSURE: 104 MMHG | DIASTOLIC BLOOD PRESSURE: 61 MMHG | RESPIRATION RATE: 16 BRPM

## 2025-02-06 VITALS
HEART RATE: 81 BPM | OXYGEN SATURATION: 91 % | SYSTOLIC BLOOD PRESSURE: 101 MMHG | DIASTOLIC BLOOD PRESSURE: 74 MMHG | RESPIRATION RATE: 25 BRPM

## 2025-02-06 VITALS
DIASTOLIC BLOOD PRESSURE: 73 MMHG | RESPIRATION RATE: 16 BRPM | SYSTOLIC BLOOD PRESSURE: 116 MMHG | HEART RATE: 76 BPM | OXYGEN SATURATION: 95 %

## 2025-02-06 VITALS — RESPIRATION RATE: 22 BRPM | HEART RATE: 54 BPM | OXYGEN SATURATION: 94 %

## 2025-02-06 VITALS
RESPIRATION RATE: 19 BRPM | OXYGEN SATURATION: 96 % | DIASTOLIC BLOOD PRESSURE: 71 MMHG | HEART RATE: 78 BPM | SYSTOLIC BLOOD PRESSURE: 115 MMHG

## 2025-02-06 VITALS
DIASTOLIC BLOOD PRESSURE: 61 MMHG | SYSTOLIC BLOOD PRESSURE: 102 MMHG | OXYGEN SATURATION: 96 % | HEART RATE: 57 BPM | RESPIRATION RATE: 18 BRPM

## 2025-02-06 VITALS — HEART RATE: 62 BPM | OXYGEN SATURATION: 95 % | RESPIRATION RATE: 14 BRPM

## 2025-02-06 VITALS
DIASTOLIC BLOOD PRESSURE: 44 MMHG | SYSTOLIC BLOOD PRESSURE: 91 MMHG | HEART RATE: 74 BPM | RESPIRATION RATE: 18 BRPM | OXYGEN SATURATION: 92 %

## 2025-02-06 VITALS
HEART RATE: 62 BPM | RESPIRATION RATE: 20 BRPM | OXYGEN SATURATION: 99 % | DIASTOLIC BLOOD PRESSURE: 59 MMHG | SYSTOLIC BLOOD PRESSURE: 93 MMHG

## 2025-02-06 VITALS — RESPIRATION RATE: 18 BRPM | OXYGEN SATURATION: 95 % | HEART RATE: 56 BPM

## 2025-02-06 VITALS
OXYGEN SATURATION: 97 % | DIASTOLIC BLOOD PRESSURE: 58 MMHG | HEART RATE: 83 BPM | RESPIRATION RATE: 20 BRPM | SYSTOLIC BLOOD PRESSURE: 106 MMHG

## 2025-02-06 VITALS — DIASTOLIC BLOOD PRESSURE: 60 MMHG | HEART RATE: 85 BPM | SYSTOLIC BLOOD PRESSURE: 120 MMHG

## 2025-02-06 VITALS — HEART RATE: 77 BPM

## 2025-02-06 VITALS — HEART RATE: 77 BPM | RESPIRATION RATE: 17 BRPM

## 2025-02-06 VITALS — HEART RATE: 56 BPM

## 2025-02-06 VITALS — HEART RATE: 58 BPM

## 2025-02-06 LAB
ANION GAP: 4 (ref 5–15)
BUN SERPL-MCNC: 29 MG/DL (ref 7–18)
BUN/CREAT RATIO: 23.4 RATIO (ref 10–20)
CALCIUM SERPL-MCNC: 8.2 MG/DL (ref 8.5–10.1)
CARBON DIOXIDE: 35 MMOL/L (ref 21–32)
CHLORIDE: 96 MMOL/L (ref 98–107)
CHOLEST SERPL-MCNC: 107 MG/DL
CREAT SERPL-MCNC: 1.24 MG/DL (ref 0.7–1.3)
DEPRECATED RDW RBC: 60.3 FL (ref 35.1–43.9)
DIFFERENTIAL INDICATED: (no result)
ERYTHROCYTE [DISTWIDTH] IN BLOOD: 18.2 % (ref 11.6–14.6)
EST GLOM FILT RATE - AFR AMER: 72 ML/MIN (ref 60–?)
ESTIMATED CREATININE CLEARANCE: 69.44 ML/MIN
GLUCOSE: 135 MG/DL (ref 74–106)
HCT VFR BLD AUTO: 33.2 % (ref 40–54)
HEMOGLOBIN: 10.1 G/DL (ref 13–16.5)
HGB BLD-MCNC: 10.1 G/DL (ref 13–16.5)
IMMATURE GRANULOCYTES COUNT: 0.03 X10^3/UL (ref 0–0)
MCV RBC: 91.2 FL (ref 80–94)
MEAN CORP HGB CONC: 30.4 G/DL (ref 32–36)
MEAN PLATELET VOL.: 10.4 FL (ref 6.2–12)
NRBC FLAGGED BY ANALYZER: 0 % (ref 0–5)
PLATELET # BLD: 126 K/MM3 (ref 150–450)
PLATELET COUNT: 126 K/MM3 (ref 150–450)
POSITIVE DIFFERENTIAL: YES
POTASSIUM: 4.8 MMOL/L (ref 3.5–5.1)
PROTHROMBIN TIME (PROTIME)PT.: 27.8 SECONDS (ref 11.7–14.9)
RBC # BLD AUTO: 3.64 M/MM3 (ref 4.6–6.2)
RBC DISTRIBUTION WIDTH CV: 18.2 % (ref 11.6–14.6)
RBC DISTRIBUTION WIDTH SD: 60.3 FL (ref 35.1–43.9)
TRIGLYCERIDES: 63 MG/DL
VLDLC SERPL-MCNC: 13 MG/DL (ref 5–40)
WBC # BLD AUTO: 4.2 K/MM3 (ref 4.4–11)
WHITE BLOOD COUNT: 4.2 K/MM3 (ref 4.4–11)

## 2025-02-06 RX ADMIN — TOLTERODINE TARTRATE 4 MG: 4 CAPSULE, EXTENDED RELEASE ORAL at 08:48

## 2025-02-06 RX ADMIN — CEFEPIME 200 GM: 2 INJECTION, POWDER, FOR SOLUTION INTRAVENOUS at 05:28

## 2025-02-06 RX ADMIN — SODIUM CHLORIDE, PRESERVATIVE FREE 0 ML: 5 INJECTION INTRAVENOUS at 08:43

## 2025-02-06 RX ADMIN — DOCUSATE SODIUM 50 MG AND SENNOSIDES 8.6 MG 2 TABLET: 8.6; 5 TABLET, FILM COATED ORAL at 20:49

## 2025-02-06 RX ADMIN — CEFEPIME 200 GM: 2 INJECTION, POWDER, FOR SOLUTION INTRAVENOUS at 14:36

## 2025-02-06 RX ADMIN — CEFEPIME 200 GM: 2 INJECTION, POWDER, FOR SOLUTION INTRAVENOUS at 20:50

## 2025-02-06 RX ADMIN — DOCUSATE SODIUM 50 MG AND SENNOSIDES 8.6 MG 2 TABLET: 8.6; 5 TABLET, FILM COATED ORAL at 08:47

## 2025-02-07 VITALS
SYSTOLIC BLOOD PRESSURE: 132 MMHG | HEART RATE: 82 BPM | OXYGEN SATURATION: 99 % | DIASTOLIC BLOOD PRESSURE: 67 MMHG | RESPIRATION RATE: 20 BRPM

## 2025-02-07 VITALS
RESPIRATION RATE: 19 BRPM | DIASTOLIC BLOOD PRESSURE: 75 MMHG | HEART RATE: 79 BPM | SYSTOLIC BLOOD PRESSURE: 126 MMHG | OXYGEN SATURATION: 95 %

## 2025-02-07 VITALS
OXYGEN SATURATION: 98 % | SYSTOLIC BLOOD PRESSURE: 126 MMHG | DIASTOLIC BLOOD PRESSURE: 68 MMHG | RESPIRATION RATE: 17 BRPM | HEART RATE: 73 BPM

## 2025-02-07 VITALS — OXYGEN SATURATION: 97 % | RESPIRATION RATE: 17 BRPM | HEART RATE: 76 BPM

## 2025-02-07 VITALS
TEMPERATURE: 97.5 F | RESPIRATION RATE: 19 BRPM | OXYGEN SATURATION: 92 % | HEART RATE: 81 BPM | DIASTOLIC BLOOD PRESSURE: 56 MMHG | SYSTOLIC BLOOD PRESSURE: 127 MMHG

## 2025-02-07 VITALS
SYSTOLIC BLOOD PRESSURE: 108 MMHG | TEMPERATURE: 98.1 F | RESPIRATION RATE: 17 BRPM | OXYGEN SATURATION: 92 % | DIASTOLIC BLOOD PRESSURE: 72 MMHG | HEART RATE: 73 BPM

## 2025-02-07 VITALS
RESPIRATION RATE: 18 BRPM | OXYGEN SATURATION: 94 % | SYSTOLIC BLOOD PRESSURE: 119 MMHG | TEMPERATURE: 98 F | HEART RATE: 86 BPM | DIASTOLIC BLOOD PRESSURE: 68 MMHG

## 2025-02-07 VITALS — RESPIRATION RATE: 22 BRPM | HEART RATE: 72 BPM | OXYGEN SATURATION: 94 %

## 2025-02-07 VITALS
HEART RATE: 80 BPM | DIASTOLIC BLOOD PRESSURE: 72 MMHG | TEMPERATURE: 97.7 F | SYSTOLIC BLOOD PRESSURE: 118 MMHG | OXYGEN SATURATION: 93 % | RESPIRATION RATE: 17 BRPM

## 2025-02-07 VITALS
HEART RATE: 80 BPM | SYSTOLIC BLOOD PRESSURE: 124 MMHG | OXYGEN SATURATION: 96 % | RESPIRATION RATE: 20 BRPM | DIASTOLIC BLOOD PRESSURE: 71 MMHG

## 2025-02-07 VITALS
RESPIRATION RATE: 18 BRPM | OXYGEN SATURATION: 94 % | DIASTOLIC BLOOD PRESSURE: 66 MMHG | HEART RATE: 86 BPM | SYSTOLIC BLOOD PRESSURE: 119 MMHG

## 2025-02-07 VITALS
HEART RATE: 88 BPM | TEMPERATURE: 97.88 F | OXYGEN SATURATION: 90 % | RESPIRATION RATE: 18 BRPM | DIASTOLIC BLOOD PRESSURE: 68 MMHG | SYSTOLIC BLOOD PRESSURE: 119 MMHG

## 2025-02-07 VITALS — RESPIRATION RATE: 16 BRPM | HEART RATE: 68 BPM | OXYGEN SATURATION: 96 %

## 2025-02-07 VITALS
TEMPERATURE: 97.16 F | HEART RATE: 91 BPM | OXYGEN SATURATION: 95 % | SYSTOLIC BLOOD PRESSURE: 120 MMHG | RESPIRATION RATE: 20 BRPM | DIASTOLIC BLOOD PRESSURE: 73 MMHG

## 2025-02-07 VITALS — RESPIRATION RATE: 18 BRPM | OXYGEN SATURATION: 96 % | HEART RATE: 80 BPM

## 2025-02-07 VITALS — HEART RATE: 80 BPM | RESPIRATION RATE: 16 BRPM

## 2025-02-07 VITALS — RESPIRATION RATE: 16 BRPM | HEART RATE: 75 BPM | OXYGEN SATURATION: 96 %

## 2025-02-07 VITALS — OXYGEN SATURATION: 96 % | HEART RATE: 76 BPM | RESPIRATION RATE: 24 BRPM

## 2025-02-07 VITALS — DIASTOLIC BLOOD PRESSURE: 57 MMHG | SYSTOLIC BLOOD PRESSURE: 109 MMHG | HEART RATE: 81 BPM

## 2025-02-07 VITALS — HEART RATE: 86 BPM

## 2025-02-07 VITALS
OXYGEN SATURATION: 95 % | HEART RATE: 72 BPM | RESPIRATION RATE: 19 BRPM | SYSTOLIC BLOOD PRESSURE: 130 MMHG | DIASTOLIC BLOOD PRESSURE: 82 MMHG

## 2025-02-07 VITALS — OXYGEN SATURATION: 83 %

## 2025-02-07 VITALS — OXYGEN SATURATION: 92 %

## 2025-02-07 LAB
ANION GAP: 6 (ref 5–15)
BUN SERPL-MCNC: 34 MG/DL (ref 7–18)
BUN/CREAT RATIO: 27.2 RATIO (ref 10–20)
CALCIUM SERPL-MCNC: 8.7 MG/DL (ref 8.5–10.1)
CARBON DIOXIDE: 34 MMOL/L (ref 21–32)
CHLORIDE: 95 MMOL/L (ref 98–107)
CREAT SERPL-MCNC: 1.25 MG/DL (ref 0.7–1.3)
DEPRECATED RDW RBC: 58.1 FL (ref 35.1–43.9)
ERYTHROCYTE [DISTWIDTH] IN BLOOD: 17.7 % (ref 11.6–14.6)
EST GLOM FILT RATE - AFR AMER: 72 ML/MIN (ref 60–?)
ESTIMATED CREATININE CLEARANCE: 68.89 ML/MIN
GLUCOSE: 158 MG/DL (ref 74–106)
HCT VFR BLD AUTO: 33.4 % (ref 40–54)
HEMOGLOBIN: 10.5 G/DL (ref 13–16.5)
HGB BLD-MCNC: 10.5 G/DL (ref 13–16.5)
MCV RBC: 90 FL (ref 80–94)
MEAN CORP HGB CONC: 31.4 G/DL (ref 32–36)
MEAN PLATELET VOL.: 10 FL (ref 6.2–12)
PLATELET # BLD: 149 K/MM3 (ref 150–450)
PLATELET COUNT: 149 K/MM3 (ref 150–450)
POTASSIUM: 3.8 MMOL/L (ref 3.5–5.1)
RBC # BLD AUTO: 3.71 M/MM3 (ref 4.6–6.2)
RBC DISTRIBUTION WIDTH CV: 17.7 % (ref 11.6–14.6)
RBC DISTRIBUTION WIDTH SD: 58.1 FL (ref 35.1–43.9)
WBC # BLD AUTO: 7.9 K/MM3 (ref 4.4–11)
WHITE BLOOD COUNT: 7.9 K/MM3 (ref 4.4–11)

## 2025-02-07 RX ADMIN — SODIUM CHLORIDE, PRESERVATIVE FREE 0 ML: 5 INJECTION INTRAVENOUS at 21:46

## 2025-02-07 RX ADMIN — DOCUSATE SODIUM 50 MG AND SENNOSIDES 8.6 MG 2 TABLET: 8.6; 5 TABLET, FILM COATED ORAL at 08:37

## 2025-02-07 RX ADMIN — SODIUM CHLORIDE, PRESERVATIVE FREE 0 ML: 5 INJECTION INTRAVENOUS at 05:28

## 2025-02-07 RX ADMIN — CEFEPIME 200 GM: 2 INJECTION, POWDER, FOR SOLUTION INTRAVENOUS at 14:36

## 2025-02-07 RX ADMIN — DOCUSATE SODIUM 50 MG AND SENNOSIDES 8.6 MG 2 TABLET: 8.6; 5 TABLET, FILM COATED ORAL at 21:40

## 2025-02-07 RX ADMIN — CEFEPIME 200 GM: 2 INJECTION, POWDER, FOR SOLUTION INTRAVENOUS at 05:25

## 2025-02-07 RX ADMIN — SODIUM CHLORIDE, PRESERVATIVE FREE 0 ML: 5 INJECTION INTRAVENOUS at 10:50

## 2025-02-07 RX ADMIN — CEFEPIME 200 GM: 2 INJECTION, POWDER, FOR SOLUTION INTRAVENOUS at 21:40

## 2025-02-07 RX ADMIN — TOLTERODINE TARTRATE 4 MG: 4 CAPSULE, EXTENDED RELEASE ORAL at 08:38

## 2025-02-08 VITALS
SYSTOLIC BLOOD PRESSURE: 121 MMHG | HEART RATE: 69 BPM | OXYGEN SATURATION: 95 % | DIASTOLIC BLOOD PRESSURE: 64 MMHG | TEMPERATURE: 98.24 F | RESPIRATION RATE: 17 BRPM

## 2025-02-08 VITALS
OXYGEN SATURATION: 93 % | SYSTOLIC BLOOD PRESSURE: 116 MMHG | RESPIRATION RATE: 16 BRPM | HEART RATE: 76 BPM | DIASTOLIC BLOOD PRESSURE: 66 MMHG | TEMPERATURE: 97.1 F

## 2025-02-08 VITALS
SYSTOLIC BLOOD PRESSURE: 116 MMHG | HEART RATE: 74 BPM | TEMPERATURE: 97.88 F | OXYGEN SATURATION: 98 % | RESPIRATION RATE: 18 BRPM | DIASTOLIC BLOOD PRESSURE: 87 MMHG

## 2025-02-08 VITALS — RESPIRATION RATE: 17 BRPM | OXYGEN SATURATION: 97 % | HEART RATE: 70 BPM

## 2025-02-08 VITALS
OXYGEN SATURATION: 96 % | TEMPERATURE: 97.52 F | DIASTOLIC BLOOD PRESSURE: 78 MMHG | HEART RATE: 81 BPM | SYSTOLIC BLOOD PRESSURE: 123 MMHG | RESPIRATION RATE: 18 BRPM

## 2025-02-08 VITALS
HEART RATE: 58 BPM | SYSTOLIC BLOOD PRESSURE: 135 MMHG | RESPIRATION RATE: 18 BRPM | TEMPERATURE: 98.06 F | DIASTOLIC BLOOD PRESSURE: 94 MMHG | OXYGEN SATURATION: 92 %

## 2025-02-08 VITALS
TEMPERATURE: 96.98 F | OXYGEN SATURATION: 93 % | RESPIRATION RATE: 18 BRPM | DIASTOLIC BLOOD PRESSURE: 82 MMHG | SYSTOLIC BLOOD PRESSURE: 119 MMHG | HEART RATE: 82 BPM

## 2025-02-08 VITALS — HEART RATE: 66 BPM | SYSTOLIC BLOOD PRESSURE: 135 MMHG | DIASTOLIC BLOOD PRESSURE: 94 MMHG

## 2025-02-08 VITALS — OXYGEN SATURATION: 94 % | RESPIRATION RATE: 16 BRPM | HEART RATE: 55 BPM

## 2025-02-08 VITALS — HEART RATE: 66 BPM | RESPIRATION RATE: 14 BRPM | OXYGEN SATURATION: 96 %

## 2025-02-08 VITALS — OXYGEN SATURATION: 96 % | HEART RATE: 83 BPM

## 2025-02-08 VITALS — OXYGEN SATURATION: 95 %

## 2025-02-08 VITALS — RESPIRATION RATE: 22 BRPM

## 2025-02-08 VITALS — OXYGEN SATURATION: 93 %

## 2025-02-08 LAB
ANION GAP: 5 (ref 5–15)
BUN SERPL-MCNC: 39 MG/DL (ref 7–18)
BUN/CREAT RATIO: 32.8 RATIO (ref 10–20)
CALCIUM SERPL-MCNC: 9.3 MG/DL (ref 8.5–10.1)
CARBON DIOXIDE: 36 MMOL/L (ref 21–32)
CHLORIDE: 95 MMOL/L (ref 98–107)
CREAT SERPL-MCNC: 1.19 MG/DL (ref 0.7–1.3)
EST GLOM FILT RATE - AFR AMER: 76 ML/MIN (ref 60–?)
ESTIMATED CREATININE CLEARANCE: 72.16 ML/MIN
GLUCOSE: 93 MG/DL (ref 74–106)
MAGNESIUM: 1.9 MG/DL (ref 1.6–2.6)
POTASSIUM: 3.8 MMOL/L (ref 3.5–5.1)

## 2025-02-08 RX ADMIN — Medication 1 PACKET: at 17:13

## 2025-02-08 RX ADMIN — TOLTERODINE TARTRATE 4 MG: 4 CAPSULE, EXTENDED RELEASE ORAL at 09:04

## 2025-02-08 RX ADMIN — SODIUM CHLORIDE, PRESERVATIVE FREE 0 ML: 5 INJECTION INTRAVENOUS at 14:13

## 2025-02-08 RX ADMIN — Medication 1 PACKET: at 10:15

## 2025-02-08 RX ADMIN — CEFEPIME 200 GM: 2 INJECTION, POWDER, FOR SOLUTION INTRAVENOUS at 04:57

## 2025-02-08 RX ADMIN — CEFEPIME 200 GM: 2 INJECTION, POWDER, FOR SOLUTION INTRAVENOUS at 21:55

## 2025-02-08 RX ADMIN — DOCUSATE SODIUM 50 MG AND SENNOSIDES 8.6 MG 2 TABLET: 8.6; 5 TABLET, FILM COATED ORAL at 09:26

## 2025-02-08 RX ADMIN — Medication 1 PACKET: at 11:56

## 2025-02-08 RX ADMIN — CEFEPIME 200 GM: 2 INJECTION, POWDER, FOR SOLUTION INTRAVENOUS at 14:09

## 2025-02-09 VITALS — OXYGEN SATURATION: 95 % | HEART RATE: 55 BPM | RESPIRATION RATE: 14 BRPM

## 2025-02-09 VITALS — HEART RATE: 81 BPM | SYSTOLIC BLOOD PRESSURE: 138 MMHG | DIASTOLIC BLOOD PRESSURE: 75 MMHG

## 2025-02-09 VITALS — RESPIRATION RATE: 22 BRPM | HEART RATE: 83 BPM | OXYGEN SATURATION: 91 %

## 2025-02-09 VITALS
SYSTOLIC BLOOD PRESSURE: 123 MMHG | TEMPERATURE: 98.1 F | HEART RATE: 60 BPM | RESPIRATION RATE: 18 BRPM | OXYGEN SATURATION: 96 % | DIASTOLIC BLOOD PRESSURE: 72 MMHG

## 2025-02-09 VITALS
HEART RATE: 57 BPM | TEMPERATURE: 97.3 F | DIASTOLIC BLOOD PRESSURE: 68 MMHG | SYSTOLIC BLOOD PRESSURE: 117 MMHG | RESPIRATION RATE: 18 BRPM | OXYGEN SATURATION: 94 %

## 2025-02-09 VITALS
TEMPERATURE: 98.06 F | SYSTOLIC BLOOD PRESSURE: 128 MMHG | OXYGEN SATURATION: 93 % | RESPIRATION RATE: 18 BRPM | HEART RATE: 82 BPM | DIASTOLIC BLOOD PRESSURE: 72 MMHG

## 2025-02-09 VITALS — RESPIRATION RATE: 20 BRPM | HEART RATE: 55 BPM

## 2025-02-09 VITALS — RESPIRATION RATE: 22 BRPM

## 2025-02-09 VITALS
OXYGEN SATURATION: 92 % | SYSTOLIC BLOOD PRESSURE: 133 MMHG | DIASTOLIC BLOOD PRESSURE: 77 MMHG | RESPIRATION RATE: 20 BRPM | TEMPERATURE: 98.1 F | HEART RATE: 79 BPM

## 2025-02-09 VITALS — HEART RATE: 55 BPM | RESPIRATION RATE: 18 BRPM | OXYGEN SATURATION: 96 %

## 2025-02-09 VITALS — RESPIRATION RATE: 17 BRPM

## 2025-02-09 VITALS — HEART RATE: 59 BPM | RESPIRATION RATE: 22 BRPM

## 2025-02-09 VITALS — HEART RATE: 66 BPM | RESPIRATION RATE: 17 BRPM

## 2025-02-09 VITALS — HEART RATE: 57 BPM

## 2025-02-09 VITALS — HEART RATE: 60 BPM

## 2025-02-09 VITALS — HEART RATE: 51 BPM

## 2025-02-09 LAB
ANION GAP: 6 (ref 5–15)
BUN SERPL-MCNC: 36 MG/DL (ref 7–18)
BUN/CREAT RATIO: 38.9 RATIO (ref 10–20)
CALCIUM SERPL-MCNC: 9 MG/DL (ref 8.5–10.1)
CARBON DIOXIDE: 36 MMOL/L (ref 21–32)
CHLORIDE: 96 MMOL/L (ref 98–107)
CREAT SERPL-MCNC: 0.92 MG/DL (ref 0.7–1.3)
DEPRECATED RDW RBC: 58 FL (ref 35.1–43.9)
ERYTHROCYTE [DISTWIDTH] IN BLOOD: 17.6 % (ref 11.6–14.6)
EST GLOM FILT RATE - AFR AMER: 102 ML/MIN (ref 60–?)
ESTIMATED CREATININE CLEARANCE: 93.11 ML/MIN
GLUCOSE: 89 MG/DL (ref 74–106)
HCT VFR BLD AUTO: 33.8 % (ref 40–54)
HEMOGLOBIN: 10.2 G/DL (ref 13–16.5)
HGB BLD-MCNC: 10.2 G/DL (ref 13–16.5)
MCV RBC: 90.4 FL (ref 80–94)
MEAN CORP HGB CONC: 30.2 G/DL (ref 32–36)
MEAN PLATELET VOL.: 9.9 FL (ref 6.2–12)
PLATELET # BLD: 148 K/MM3 (ref 150–450)
PLATELET COUNT: 148 K/MM3 (ref 150–450)
POTASSIUM: 4.3 MMOL/L (ref 3.5–5.1)
RBC # BLD AUTO: 3.74 M/MM3 (ref 4.6–6.2)
RBC DISTRIBUTION WIDTH CV: 17.6 % (ref 11.6–14.6)
RBC DISTRIBUTION WIDTH SD: 58 FL (ref 35.1–43.9)
WBC # BLD AUTO: 5.4 K/MM3 (ref 4.4–11)
WHITE BLOOD COUNT: 5.4 K/MM3 (ref 4.4–11)

## 2025-02-09 RX ADMIN — DOCUSATE SODIUM 50 MG AND SENNOSIDES 8.6 MG 2 TABLET: 8.6; 5 TABLET, FILM COATED ORAL at 09:19

## 2025-02-09 RX ADMIN — SODIUM CHLORIDE, PRESERVATIVE FREE 0 ML: 5 INJECTION INTRAVENOUS at 20:23

## 2025-02-09 RX ADMIN — TOLTERODINE TARTRATE 4 MG: 4 CAPSULE, EXTENDED RELEASE ORAL at 09:21

## 2025-02-09 RX ADMIN — CEFEPIME 200 GM: 2 INJECTION, POWDER, FOR SOLUTION INTRAVENOUS at 05:41

## 2025-02-09 RX ADMIN — CEFEPIME 200 GM: 2 INJECTION, POWDER, FOR SOLUTION INTRAVENOUS at 13:45

## 2025-02-09 RX ADMIN — SODIUM CHLORIDE, PRESERVATIVE FREE 0 ML: 5 INJECTION INTRAVENOUS at 13:45

## 2025-02-09 RX ADMIN — CEFEPIME 200 GM: 2 INJECTION, POWDER, FOR SOLUTION INTRAVENOUS at 20:22

## 2025-02-10 VITALS
DIASTOLIC BLOOD PRESSURE: 61 MMHG | OXYGEN SATURATION: 96 % | HEART RATE: 69 BPM | SYSTOLIC BLOOD PRESSURE: 133 MMHG | RESPIRATION RATE: 16 BRPM | TEMPERATURE: 98.1 F

## 2025-02-10 VITALS
DIASTOLIC BLOOD PRESSURE: 72 MMHG | TEMPERATURE: 96.4 F | RESPIRATION RATE: 22 BRPM | HEART RATE: 67 BPM | OXYGEN SATURATION: 94 % | SYSTOLIC BLOOD PRESSURE: 120 MMHG

## 2025-02-10 VITALS — SYSTOLIC BLOOD PRESSURE: 126 MMHG | HEART RATE: 76 BPM | DIASTOLIC BLOOD PRESSURE: 73 MMHG

## 2025-02-10 VITALS
SYSTOLIC BLOOD PRESSURE: 125 MMHG | TEMPERATURE: 98.24 F | DIASTOLIC BLOOD PRESSURE: 65 MMHG | OXYGEN SATURATION: 95 % | HEART RATE: 69 BPM | RESPIRATION RATE: 20 BRPM

## 2025-02-10 VITALS — HEART RATE: 63 BPM | OXYGEN SATURATION: 98 % | RESPIRATION RATE: 18 BRPM

## 2025-02-10 VITALS — OXYGEN SATURATION: 93 %

## 2025-02-10 VITALS — OXYGEN SATURATION: 97 % | HEART RATE: 63 BPM | RESPIRATION RATE: 14 BRPM

## 2025-02-10 VITALS — HEART RATE: 50 BPM

## 2025-02-10 VITALS — RESPIRATION RATE: 20 BRPM

## 2025-02-10 LAB
ANION GAP: 4 (ref 5–15)
BUN SERPL-MCNC: 32 MG/DL (ref 7–18)
BUN/CREAT RATIO: 34.1 RATIO (ref 10–20)
CALCIUM SERPL-MCNC: 9.5 MG/DL (ref 8.5–10.1)
CARBON DIOXIDE: 34 MMOL/L (ref 21–32)
CHLORIDE: 97 MMOL/L (ref 98–107)
CREAT SERPL-MCNC: 0.94 MG/DL (ref 0.7–1.3)
EST GLOM FILT RATE - AFR AMER: 100 ML/MIN (ref 60–?)
ESTIMATED CREATININE CLEARANCE: 91.13 ML/MIN
GLUCOSE: 114 MG/DL (ref 74–106)
POTASSIUM: 4.5 MMOL/L (ref 3.5–5.1)

## 2025-02-10 RX ADMIN — DOCUSATE SODIUM 50 MG AND SENNOSIDES 8.6 MG 2 TABLET: 8.6; 5 TABLET, FILM COATED ORAL at 10:06

## 2025-02-10 RX ADMIN — WARFARIN SODIUM 2 MG: 2 TABLET ORAL at 16:25

## 2025-02-10 RX ADMIN — CEFEPIME 200 GM: 2 INJECTION, POWDER, FOR SOLUTION INTRAVENOUS at 05:18

## 2025-02-10 RX ADMIN — CEFEPIME 200 GM: 2 INJECTION, POWDER, FOR SOLUTION INTRAVENOUS at 12:51

## 2025-02-10 RX ADMIN — TOLTERODINE TARTRATE 4 MG: 4 CAPSULE, EXTENDED RELEASE ORAL at 10:04

## 2025-02-12 ENCOUNTER — APPOINTMENT (OUTPATIENT)
Dept: UROLOGY | Facility: CLINIC | Age: 80
End: 2025-02-12
Payer: MEDICARE

## 2025-05-22 ENCOUNTER — HOSPITAL ENCOUNTER (INPATIENT)
Dept: HOSPITAL 100 - ED | Age: 80
LOS: 7 days | Discharge: NURSING, REHAB OR LONG-TERM CARE | DRG: 189 | End: 2025-05-29
Payer: MEDICARE

## 2025-05-22 VITALS
RESPIRATION RATE: 18 BRPM | DIASTOLIC BLOOD PRESSURE: 88 MMHG | HEART RATE: 64 BPM | SYSTOLIC BLOOD PRESSURE: 131 MMHG | OXYGEN SATURATION: 94 % | TEMPERATURE: 98.6 F

## 2025-05-22 VITALS
DIASTOLIC BLOOD PRESSURE: 60 MMHG | OXYGEN SATURATION: 93 % | RESPIRATION RATE: 17 BRPM | SYSTOLIC BLOOD PRESSURE: 129 MMHG | HEART RATE: 67 BPM | TEMPERATURE: 98.3 F

## 2025-05-22 VITALS
OXYGEN SATURATION: 93 % | SYSTOLIC BLOOD PRESSURE: 137 MMHG | HEART RATE: 65 BPM | RESPIRATION RATE: 18 BRPM | TEMPERATURE: 97.34 F | DIASTOLIC BLOOD PRESSURE: 88 MMHG

## 2025-05-22 VITALS — HEART RATE: 64 BPM

## 2025-05-22 VITALS
HEART RATE: 67 BPM | RESPIRATION RATE: 22 BRPM | DIASTOLIC BLOOD PRESSURE: 81 MMHG | OXYGEN SATURATION: 94 % | SYSTOLIC BLOOD PRESSURE: 130 MMHG | TEMPERATURE: 97.16 F

## 2025-05-22 VITALS — BODY MASS INDEX: 35.5 KG/M2

## 2025-05-22 VITALS
OXYGEN SATURATION: 99 % | RESPIRATION RATE: 15 BRPM | HEART RATE: 60 BPM | DIASTOLIC BLOOD PRESSURE: 70 MMHG | SYSTOLIC BLOOD PRESSURE: 116 MMHG | TEMPERATURE: 97.88 F

## 2025-05-22 VITALS — OXYGEN SATURATION: 98 % | RESPIRATION RATE: 17 BRPM | HEART RATE: 56 BPM

## 2025-05-22 VITALS — RESPIRATION RATE: 26 BRPM | HEART RATE: 63 BPM | OXYGEN SATURATION: 97 %

## 2025-05-22 VITALS — OXYGEN SATURATION: 95 %

## 2025-05-22 VITALS — OXYGEN SATURATION: 98 %

## 2025-05-22 VITALS — HEART RATE: 76 BPM | OXYGEN SATURATION: 100 % | RESPIRATION RATE: 20 BRPM

## 2025-05-22 VITALS — HEART RATE: 55 BPM | RESPIRATION RATE: 12 BRPM | OXYGEN SATURATION: 96 %

## 2025-05-22 VITALS
OXYGEN SATURATION: 91 % | HEART RATE: 67 BPM | TEMPERATURE: 96.9 F | SYSTOLIC BLOOD PRESSURE: 127 MMHG | RESPIRATION RATE: 19 BRPM | DIASTOLIC BLOOD PRESSURE: 82 MMHG

## 2025-05-22 VITALS
OXYGEN SATURATION: 95 % | TEMPERATURE: 98.5 F | DIASTOLIC BLOOD PRESSURE: 80 MMHG | HEART RATE: 79 BPM | SYSTOLIC BLOOD PRESSURE: 127 MMHG | RESPIRATION RATE: 18 BRPM

## 2025-05-22 VITALS — RESPIRATION RATE: 12 BRPM | HEART RATE: 65 BPM | OXYGEN SATURATION: 96 %

## 2025-05-22 VITALS — HEART RATE: 76 BPM | DIASTOLIC BLOOD PRESSURE: 88 MMHG | SYSTOLIC BLOOD PRESSURE: 120 MMHG | RESPIRATION RATE: 21 BRPM

## 2025-05-22 VITALS — HEART RATE: 81 BPM | RESPIRATION RATE: 20 BRPM

## 2025-05-22 DIAGNOSIS — J96.22: Primary | ICD-10-CM

## 2025-05-22 DIAGNOSIS — F32.A: ICD-10-CM

## 2025-05-22 DIAGNOSIS — I48.11: ICD-10-CM

## 2025-05-22 DIAGNOSIS — I42.0: ICD-10-CM

## 2025-05-22 DIAGNOSIS — J44.1: ICD-10-CM

## 2025-05-22 DIAGNOSIS — N32.81: ICD-10-CM

## 2025-05-22 DIAGNOSIS — J69.0: ICD-10-CM

## 2025-05-22 DIAGNOSIS — I11.0: ICD-10-CM

## 2025-05-22 DIAGNOSIS — Z85.038: ICD-10-CM

## 2025-05-22 DIAGNOSIS — D53.9: ICD-10-CM

## 2025-05-22 DIAGNOSIS — G47.33: ICD-10-CM

## 2025-05-22 DIAGNOSIS — R79.1: ICD-10-CM

## 2025-05-22 DIAGNOSIS — Z85.46: ICD-10-CM

## 2025-05-22 DIAGNOSIS — E78.5: ICD-10-CM

## 2025-05-22 DIAGNOSIS — B34.8: ICD-10-CM

## 2025-05-22 DIAGNOSIS — F17.210: ICD-10-CM

## 2025-05-22 DIAGNOSIS — J96.21: ICD-10-CM

## 2025-05-22 DIAGNOSIS — E87.29: ICD-10-CM

## 2025-05-22 DIAGNOSIS — E66.812: ICD-10-CM

## 2025-05-22 DIAGNOSIS — I49.3: ICD-10-CM

## 2025-05-22 DIAGNOSIS — R53.81: ICD-10-CM

## 2025-05-22 DIAGNOSIS — I50.32: ICD-10-CM

## 2025-05-22 DIAGNOSIS — Z79.01: ICD-10-CM

## 2025-05-22 LAB
ANION GAP SERPL CALC-SCNC: 7 MMOL/L (ref 5–15)
BASE EXCESS BLDV CALC-SCNC: 16 MMOL/L (ref -1–3.5)
BASE EXCESS BLDV CALC-SCNC: 18 MMOL/L
BASE EXCESS BLDV CALC-SCNC: 20 MMOL/L
BUN SERPL-MCNC: 14 MG/DL (ref 4–19)
BUN/CREAT SERPL: 13.4 RATIO (ref 10–20)
CALCIUM SERPL-MCNC: 9.1 MG/DL (ref 7.6–11)
CHLORIDE: 93 MMOL/L (ref 98–108)
CO2 BLDCV-SCNC: 37.8 MMOL/L (ref 21–32)
CO2 BLDV-SCNC: 44 MMOL/L (ref 23–33)
DEPRECATED RDW RBC: 56.9 FL (ref 35.1–43.9)
ERYTHROCYTE [DISTWIDTH] IN BLOOD: 16.7 % (ref 11.6–14.6)
GLUCOSE SERPL-MCNC: 122 MG/DL (ref 70–99)
HCT VFR BLD AUTO: 34.7 % (ref 40–54)
HGB BLD-MCNC: 10.1 G/DL (ref 13–16.5)
MCV RBC: 94.3 FL (ref 80–94)
MEAN CORP HGB CONC: 29.1 G/DL (ref 32–36)
MEAN PLATELET VOL.: 10.5 FL (ref 6.2–12)
NRBC FLAGGED BY ANALYZER: 0 % (ref 0–5)
PEEP: 6
PIP: 12
PLATELET # BLD: 192 K/MM3 (ref 150–450)
PO2 BLDA: 64 MMHG (ref 75–100)
PO2 BLDA: 73 MMHG (ref 75–100)
PO2 BLDV: 112 MMHG (ref 25–40)
POTASSIUM SPEC-MCNC: 4.2 MMOL/L (ref 3.3–5.1)
PRO- BRAIN NATRIURETIC PEPTIDE: 2656 PG/ML (ref ?–1800)
PROTHROMBIN TIME (PROTIME)PT.: 38.9 SECONDS (ref 11.7–14.9)
RBC # BLD AUTO: 3.68 M/MM3 (ref 4.6–6.2)
RR: 12
RR: 12
SO2: 87 % (ref 95–99)
SO2: 93 % (ref 95–99)
VBG SO2: 98 % (ref 50–70)
WBC # BLD AUTO: 6.4 K/MM3 (ref 4.4–11)

## 2025-05-22 PROCEDURE — 71045 X-RAY EXAM CHEST 1 VIEW: CPT

## 2025-05-22 PROCEDURE — 83880 ASSAY OF NATRIURETIC PEPTIDE: CPT

## 2025-05-22 PROCEDURE — 97166 OT EVAL MOD COMPLEX 45 MIN: CPT

## 2025-05-22 PROCEDURE — 87633 RESP VIRUS 12-25 TARGETS: CPT

## 2025-05-22 PROCEDURE — 85025 COMPLETE CBC W/AUTO DIFF WBC: CPT

## 2025-05-22 PROCEDURE — 97110 THERAPEUTIC EXERCISES: CPT

## 2025-05-22 PROCEDURE — 94002 VENT MGMT INPAT INIT DAY: CPT

## 2025-05-22 PROCEDURE — 87635 SARS-COV-2 COVID-19 AMP PRB: CPT

## 2025-05-22 PROCEDURE — A4216 STERILE WATER/SALINE, 10 ML: HCPCS

## 2025-05-22 PROCEDURE — 97116 GAIT TRAINING THERAPY: CPT

## 2025-05-22 PROCEDURE — 94660 CPAP INITIATION&MGMT: CPT

## 2025-05-22 PROCEDURE — 94668 MNPJ CHEST WALL SBSQ: CPT

## 2025-05-22 PROCEDURE — 36600 WITHDRAWAL OF ARTERIAL BLOOD: CPT

## 2025-05-22 PROCEDURE — 85027 COMPLETE CBC AUTOMATED: CPT

## 2025-05-22 PROCEDURE — 94762 N-INVAS EAR/PLS OXIMTRY CONT: CPT

## 2025-05-22 PROCEDURE — 80053 COMPREHEN METABOLIC PANEL: CPT

## 2025-05-22 PROCEDURE — 94003 VENT MGMT INPAT SUBQ DAY: CPT

## 2025-05-22 PROCEDURE — 85610 PROTHROMBIN TIME: CPT

## 2025-05-22 PROCEDURE — 93005 ELECTROCARDIOGRAM TRACING: CPT

## 2025-05-22 PROCEDURE — 99252 IP/OBS CONSLTJ NEW/EST SF 35: CPT

## 2025-05-22 PROCEDURE — G0463 HOSPITAL OUTPT CLINIC VISIT: HCPCS

## 2025-05-22 PROCEDURE — 97535 SELF CARE MNGMENT TRAINING: CPT

## 2025-05-22 PROCEDURE — 97162 PT EVAL MOD COMPLEX 30 MIN: CPT

## 2025-05-22 PROCEDURE — 80048 BASIC METABOLIC PNL TOTAL CA: CPT

## 2025-05-22 PROCEDURE — 87070 CULTURE OTHR SPECIMN AEROBIC: CPT

## 2025-05-22 PROCEDURE — 97803 MED NUTRITION INDIV SUBSEQ: CPT

## 2025-05-22 PROCEDURE — 36415 COLL VENOUS BLD VENIPUNCTURE: CPT

## 2025-05-22 PROCEDURE — 82803 BLOOD GASES ANY COMBINATION: CPT

## 2025-05-22 PROCEDURE — 99285 EMERGENCY DEPT VISIT HI MDM: CPT

## 2025-05-22 PROCEDURE — 87205 SMEAR GRAM STAIN: CPT

## 2025-05-22 PROCEDURE — 99406 BEHAV CHNG SMOKING 3-10 MIN: CPT

## 2025-05-22 PROCEDURE — 97530 THERAPEUTIC ACTIVITIES: CPT

## 2025-05-22 PROCEDURE — 84443 ASSAY THYROID STIM HORMONE: CPT

## 2025-05-22 PROCEDURE — 83735 ASSAY OF MAGNESIUM: CPT

## 2025-05-22 PROCEDURE — 94640 AIRWAY INHALATION TREATMENT: CPT

## 2025-05-22 PROCEDURE — 84100 ASSAY OF PHOSPHORUS: CPT

## 2025-05-22 RX ADMIN — SODIUM CHLORIDE, PRESERVATIVE FREE 0 ML: 5 INJECTION INTRAVENOUS at 17:01

## 2025-05-22 RX ADMIN — PIPERACILLIN SODIUM AND TAZOBACTAM SODIUM 100 GM: 3; .375 INJECTION, POWDER, LYOPHILIZED, FOR SOLUTION INTRAVENOUS at 17:43

## 2025-05-22 RX ADMIN — PIPERACILLIN SODIUM AND TAZOBACTAM SODIUM 12.5 GM: 3; .375 INJECTION, POWDER, LYOPHILIZED, FOR SOLUTION INTRAVENOUS at 21:38

## 2025-05-22 RX ADMIN — SODIUM CHLORIDE, PRESERVATIVE FREE 0 ML: 5 INJECTION INTRAVENOUS at 21:38

## 2025-05-23 VITALS — RESPIRATION RATE: 12 BRPM | OXYGEN SATURATION: 97 % | HEART RATE: 65 BPM

## 2025-05-23 VITALS — OXYGEN SATURATION: 93 % | HEART RATE: 47 BPM | RESPIRATION RATE: 12 BRPM

## 2025-05-23 VITALS
HEART RATE: 48 BPM | RESPIRATION RATE: 15 BRPM | OXYGEN SATURATION: 93 % | DIASTOLIC BLOOD PRESSURE: 58 MMHG | TEMPERATURE: 96.9 F | SYSTOLIC BLOOD PRESSURE: 116 MMHG

## 2025-05-23 VITALS — DIASTOLIC BLOOD PRESSURE: 65 MMHG | HEART RATE: 69 BPM | SYSTOLIC BLOOD PRESSURE: 115 MMHG

## 2025-05-23 VITALS
SYSTOLIC BLOOD PRESSURE: 103 MMHG | HEART RATE: 62 BPM | TEMPERATURE: 97.52 F | OXYGEN SATURATION: 94 % | RESPIRATION RATE: 18 BRPM | DIASTOLIC BLOOD PRESSURE: 74 MMHG

## 2025-05-23 VITALS — HEART RATE: 66 BPM | OXYGEN SATURATION: 95 % | RESPIRATION RATE: 12 BRPM

## 2025-05-23 VITALS — OXYGEN SATURATION: 77 %

## 2025-05-23 VITALS
SYSTOLIC BLOOD PRESSURE: 124 MMHG | RESPIRATION RATE: 18 BRPM | OXYGEN SATURATION: 92 % | HEART RATE: 52 BPM | TEMPERATURE: 97.6 F | DIASTOLIC BLOOD PRESSURE: 69 MMHG

## 2025-05-23 VITALS
RESPIRATION RATE: 18 BRPM | DIASTOLIC BLOOD PRESSURE: 65 MMHG | OXYGEN SATURATION: 94 % | HEART RATE: 69 BPM | TEMPERATURE: 98 F | SYSTOLIC BLOOD PRESSURE: 115 MMHG

## 2025-05-23 VITALS — SYSTOLIC BLOOD PRESSURE: 124 MMHG | DIASTOLIC BLOOD PRESSURE: 69 MMHG | HEART RATE: 52 BPM

## 2025-05-23 VITALS — OXYGEN SATURATION: 96 % | HEART RATE: 68 BPM | RESPIRATION RATE: 20 BRPM

## 2025-05-23 VITALS — HEART RATE: 75 BPM | RESPIRATION RATE: 20 BRPM

## 2025-05-23 VITALS — HEART RATE: 58 BPM | RESPIRATION RATE: 20 BRPM

## 2025-05-23 VITALS — HEART RATE: 60 BPM | OXYGEN SATURATION: 96 % | RESPIRATION RATE: 12 BRPM

## 2025-05-23 VITALS — HEART RATE: 60 BPM | RESPIRATION RATE: 18 BRPM

## 2025-05-23 VITALS — OXYGEN SATURATION: 93 %

## 2025-05-23 VITALS — OXYGEN SATURATION: 92 %

## 2025-05-23 LAB
ALBUMIN SERPL-MCNC: 3.3 G/DL (ref 3.4–4.8)
ALP SERPL-CCNC: 74 U/L (ref 40–129)
ALT SERPL-CCNC: 7 U/L (ref ?–46)
ANION GAP SERPL CALC-SCNC: 10 MMOL/L (ref 5–15)
AST(SGOT): 17 U/L (ref ?–37)
BUN SERPL-MCNC: 15 MG/DL (ref 4–19)
BUN/CREAT SERPL: 14.2 RATIO (ref 10–20)
CALCIUM SERPL-MCNC: 9.2 MG/DL (ref 7.6–11)
CHLORIDE: 91 MMOL/L (ref 98–108)
CO2 BLDCV-SCNC: 37.8 MMOL/L (ref 21–32)
DEPRECATED RDW RBC: 56.3 FL (ref 35.1–43.9)
DIFFERENTIAL INDICATED: (no result)
ERYTHROCYTE [DISTWIDTH] IN BLOOD: 16.7 % (ref 11.6–14.6)
ESTIMATED CREATININE CLEARANCE: 80.85 ML/MIN (ref 50–250)
GLOBULIN: 3.3 G/DL (ref 2.2–4.2)
GLUCOSE SERPL-MCNC: 113 MG/DL (ref 70–99)
HCT VFR BLD AUTO: 31.5 % (ref 40–54)
HGB BLD-MCNC: 9.3 G/DL (ref 13–16.5)
MAGNESIUM SPEC-MCNC: 1.9 MG/DL (ref 1.5–2.2)
MCV RBC: 92.6 FL (ref 80–94)
MEAN CORP HGB CONC: 29.5 G/DL (ref 32–36)
MEAN PLATELET VOL.: 10.9 FL (ref 6.2–12)
NRBC FLAGGED BY ANALYZER: 0 % (ref 0–5)
PLATELET # BLD: 179 K/MM3 (ref 150–450)
POSITIVE DIFFERENTIAL: YES
POTASSIUM SPEC-MCNC: 3.8 MMOL/L (ref 3.3–5.1)
PROTHROMBIN TIME (PROTIME)PT.: 38.1 SECONDS (ref 11.7–14.9)
PROTHROMBIN TIME (PROTIME)PT.: 38.5 SECONDS (ref 11.7–14.9)
WBC # BLD AUTO: 5.4 K/MM3 (ref 4.4–11)

## 2025-05-23 RX ADMIN — PIPERACILLIN SODIUM AND TAZOBACTAM SODIUM 12.5 GM: 3; .375 INJECTION, POWDER, LYOPHILIZED, FOR SOLUTION INTRAVENOUS at 06:00

## 2025-05-23 RX ADMIN — TOLTERODINE TARTRATE 4 MG: 4 CAPSULE, EXTENDED RELEASE ORAL at 09:23

## 2025-05-23 RX ADMIN — PIPERACILLIN SODIUM AND TAZOBACTAM SODIUM 12.5 GM: 3; .375 INJECTION, POWDER, LYOPHILIZED, FOR SOLUTION INTRAVENOUS at 13:29

## 2025-05-23 RX ADMIN — SODIUM CHLORIDE, PRESERVATIVE FREE 0 ML: 5 INJECTION INTRAVENOUS at 22:24

## 2025-05-23 RX ADMIN — PIPERACILLIN SODIUM AND TAZOBACTAM SODIUM 12.5 GM: 3; .375 INJECTION, POWDER, LYOPHILIZED, FOR SOLUTION INTRAVENOUS at 22:24

## 2025-05-23 RX ADMIN — SODIUM CHLORIDE, PRESERVATIVE FREE 0 ML: 5 INJECTION INTRAVENOUS at 13:32

## 2025-05-23 RX ADMIN — Medication 50 MCG: at 09:23

## 2025-05-24 VITALS — HEART RATE: 58 BPM | RESPIRATION RATE: 18 BRPM | OXYGEN SATURATION: 98 %

## 2025-05-24 VITALS
HEART RATE: 51 BPM | OXYGEN SATURATION: 97 % | RESPIRATION RATE: 16 BRPM | DIASTOLIC BLOOD PRESSURE: 71 MMHG | SYSTOLIC BLOOD PRESSURE: 130 MMHG | TEMPERATURE: 97.3 F

## 2025-05-24 VITALS — HEART RATE: 57 BPM | OXYGEN SATURATION: 94 % | RESPIRATION RATE: 12 BRPM

## 2025-05-24 VITALS
HEART RATE: 75 BPM | TEMPERATURE: 98.2 F | RESPIRATION RATE: 18 BRPM | OXYGEN SATURATION: 95 % | DIASTOLIC BLOOD PRESSURE: 79 MMHG | SYSTOLIC BLOOD PRESSURE: 130 MMHG

## 2025-05-24 VITALS — HEART RATE: 54 BPM | RESPIRATION RATE: 16 BRPM

## 2025-05-24 VITALS
OXYGEN SATURATION: 90 % | SYSTOLIC BLOOD PRESSURE: 115 MMHG | DIASTOLIC BLOOD PRESSURE: 65 MMHG | TEMPERATURE: 98.1 F | HEART RATE: 56 BPM | RESPIRATION RATE: 24 BRPM

## 2025-05-24 VITALS
OXYGEN SATURATION: 94 % | HEART RATE: 64 BPM | DIASTOLIC BLOOD PRESSURE: 65 MMHG | TEMPERATURE: 98.42 F | SYSTOLIC BLOOD PRESSURE: 122 MMHG | RESPIRATION RATE: 20 BRPM

## 2025-05-24 VITALS — HEART RATE: 70 BPM | RESPIRATION RATE: 18 BRPM

## 2025-05-24 VITALS — RESPIRATION RATE: 12 BRPM | HEART RATE: 52 BPM | OXYGEN SATURATION: 94 %

## 2025-05-24 VITALS
RESPIRATION RATE: 22 BRPM | OXYGEN SATURATION: 94 % | TEMPERATURE: 98.3 F | DIASTOLIC BLOOD PRESSURE: 91 MMHG | SYSTOLIC BLOOD PRESSURE: 130 MMHG | HEART RATE: 77 BPM

## 2025-05-24 VITALS — OXYGEN SATURATION: 92 %

## 2025-05-24 VITALS — HEART RATE: 64 BPM

## 2025-05-24 VITALS — OXYGEN SATURATION: 87 %

## 2025-05-24 VITALS — BODY MASS INDEX: 36.1 KG/M2

## 2025-05-24 VITALS — HEART RATE: 62 BPM | RESPIRATION RATE: 18 BRPM

## 2025-05-24 LAB
ANION GAP SERPL CALC-SCNC: 9 MMOL/L (ref 5–15)
BUN SERPL-MCNC: 22 MG/DL (ref 4–19)
BUN/CREAT SERPL: 17.7 RATIO (ref 10–20)
CALCIUM SERPL-MCNC: 9.2 MG/DL (ref 7.6–11)
CHLORIDE: 89 MMOL/L (ref 98–108)
CO2 BLDCV-SCNC: 37.1 MMOL/L (ref 21–32)
DEPRECATED RDW RBC: 55.3 FL (ref 35.1–43.9)
ERYTHROCYTE [DISTWIDTH] IN BLOOD: 16.5 % (ref 11.6–14.6)
ESTIMATED CREATININE CLEARANCE: 69.78 ML/MIN (ref 50–250)
GLUCOSE SERPL-MCNC: 126 MG/DL (ref 70–99)
HCT VFR BLD AUTO: 31.5 % (ref 40–54)
HGB BLD-MCNC: 9.3 G/DL (ref 13–16.5)
MAGNESIUM SPEC-MCNC: 2.1 MG/DL (ref 1.5–2.2)
MCV RBC: 91.3 FL (ref 80–94)
MEAN CORP HGB CONC: 29.5 G/DL (ref 32–36)
MEAN PLATELET VOL.: 10.8 FL (ref 6.2–12)
PLATELET # BLD: 184 K/MM3 (ref 150–450)
PROTHROMBIN TIME (PROTIME)PT.: 41.1 SECONDS (ref 11.7–14.9)
RBC # BLD AUTO: 3.45 M/MM3 (ref 4.6–6.2)

## 2025-05-24 RX ADMIN — SODIUM CHLORIDE, PRESERVATIVE FREE 0 ML: 5 INJECTION INTRAVENOUS at 13:53

## 2025-05-24 RX ADMIN — TOLTERODINE TARTRATE 4 MG: 4 CAPSULE, EXTENDED RELEASE ORAL at 10:42

## 2025-05-24 RX ADMIN — Medication 50 MCG: at 10:43

## 2025-05-24 RX ADMIN — SODIUM CHLORIDE, PRESERVATIVE FREE 0 ML: 5 INJECTION INTRAVENOUS at 21:17

## 2025-05-24 RX ADMIN — PIPERACILLIN SODIUM AND TAZOBACTAM SODIUM 12.5 GM: 3; .375 INJECTION, POWDER, LYOPHILIZED, FOR SOLUTION INTRAVENOUS at 05:56

## 2025-05-25 VITALS — HEART RATE: 57 BPM | OXYGEN SATURATION: 97 % | RESPIRATION RATE: 16 BRPM

## 2025-05-25 VITALS
DIASTOLIC BLOOD PRESSURE: 71 MMHG | HEART RATE: 63 BPM | SYSTOLIC BLOOD PRESSURE: 118 MMHG | TEMPERATURE: 98.3 F | OXYGEN SATURATION: 94 % | RESPIRATION RATE: 17 BRPM

## 2025-05-25 VITALS — HEART RATE: 56 BPM

## 2025-05-25 VITALS — OXYGEN SATURATION: 94 %

## 2025-05-25 VITALS
SYSTOLIC BLOOD PRESSURE: 118 MMHG | TEMPERATURE: 98 F | OXYGEN SATURATION: 92 % | RESPIRATION RATE: 18 BRPM | HEART RATE: 56 BPM | DIASTOLIC BLOOD PRESSURE: 71 MMHG

## 2025-05-25 VITALS
OXYGEN SATURATION: 93 % | HEART RATE: 84 BPM | DIASTOLIC BLOOD PRESSURE: 73 MMHG | TEMPERATURE: 98.06 F | SYSTOLIC BLOOD PRESSURE: 121 MMHG | RESPIRATION RATE: 18 BRPM

## 2025-05-25 VITALS — HEART RATE: 56 BPM | RESPIRATION RATE: 18 BRPM

## 2025-05-25 VITALS — OXYGEN SATURATION: 95 % | HEART RATE: 55 BPM | RESPIRATION RATE: 18 BRPM

## 2025-05-25 VITALS
RESPIRATION RATE: 14 BRPM | DIASTOLIC BLOOD PRESSURE: 79 MMHG | HEART RATE: 55 BPM | OXYGEN SATURATION: 94 % | SYSTOLIC BLOOD PRESSURE: 120 MMHG | TEMPERATURE: 97.34 F

## 2025-05-25 VITALS — OXYGEN SATURATION: 97 % | RESPIRATION RATE: 14 BRPM | HEART RATE: 62 BPM

## 2025-05-25 VITALS — RESPIRATION RATE: 18 BRPM | OXYGEN SATURATION: 94 % | HEART RATE: 62 BPM

## 2025-05-25 VITALS — HEART RATE: 84 BPM

## 2025-05-25 VITALS — RESPIRATION RATE: 12 BRPM | OXYGEN SATURATION: 95 % | HEART RATE: 65 BPM

## 2025-05-25 VITALS — OXYGEN SATURATION: 85 %

## 2025-05-25 VITALS — HEART RATE: 60 BPM | RESPIRATION RATE: 18 BRPM

## 2025-05-25 LAB
ANION GAP SERPL CALC-SCNC: 10 MMOL/L (ref 5–15)
BUN SERPL-MCNC: 24 MG/DL (ref 4–19)
CALCIUM SERPL-MCNC: 9.2 MG/DL (ref 7.6–11)
CHLORIDE: 91 MMOL/L (ref 98–108)
CO2 BLDCV-SCNC: 36.2 MMOL/L (ref 21–32)
DEPRECATED RDW RBC: 54.5 FL (ref 35.1–43.9)
DIFFERENTIAL INDICATED: (no result)
ERYTHROCYTE [DISTWIDTH] IN BLOOD: 16.3 % (ref 11.6–14.6)
GLUCOSE SERPL-MCNC: 133 MG/DL (ref 70–99)
HCT VFR BLD AUTO: 31.2 % (ref 40–54)
HGB BLD-MCNC: 9.4 G/DL (ref 13–16.5)
MCV RBC: 90.7 FL (ref 80–94)
MEAN CORP HGB CONC: 30.1 G/DL (ref 32–36)
MEAN PLATELET VOL.: 10.6 FL (ref 6.2–12)
NRBC FLAGGED BY ANALYZER: 0 % (ref 0–5)
PLATELET # BLD: 182 K/MM3 (ref 150–450)
POSITIVE DIFFERENTIAL: YES
POTASSIUM SPEC-MCNC: 3.7 MMOL/L (ref 3.3–5.1)
PROTHROMBIN TIME (PROTIME)PT.: 31.9 SECONDS (ref 11.7–14.9)
RBC # BLD AUTO: 3.44 M/MM3 (ref 4.6–6.2)
WBC # BLD AUTO: 8.9 K/MM3 (ref 4.4–11)

## 2025-05-25 RX ADMIN — SODIUM CHLORIDE, PRESERVATIVE FREE 0 ML: 5 INJECTION INTRAVENOUS at 21:18

## 2025-05-25 RX ADMIN — TOLTERODINE TARTRATE 4 MG: 4 CAPSULE, EXTENDED RELEASE ORAL at 08:38

## 2025-05-25 RX ADMIN — Medication 50 MCG: at 08:39

## 2025-05-25 RX ADMIN — SODIUM CHLORIDE, PRESERVATIVE FREE 0 ML: 5 INJECTION INTRAVENOUS at 05:28

## 2025-05-25 RX ADMIN — SODIUM CHLORIDE, PRESERVATIVE FREE 0 ML: 5 INJECTION INTRAVENOUS at 14:38

## 2025-05-26 VITALS — HEART RATE: 60 BPM | RESPIRATION RATE: 18 BRPM

## 2025-05-26 VITALS — RESPIRATION RATE: 12 BRPM | HEART RATE: 57 BPM

## 2025-05-26 VITALS — BODY MASS INDEX: 35.3 KG/M2

## 2025-05-26 VITALS — OXYGEN SATURATION: 97 % | RESPIRATION RATE: 16 BRPM | HEART RATE: 55 BPM

## 2025-05-26 VITALS — OXYGEN SATURATION: 98 %

## 2025-05-26 VITALS
RESPIRATION RATE: 18 BRPM | HEART RATE: 68 BPM | DIASTOLIC BLOOD PRESSURE: 73 MMHG | TEMPERATURE: 98.42 F | OXYGEN SATURATION: 95 % | SYSTOLIC BLOOD PRESSURE: 126 MMHG

## 2025-05-26 VITALS
SYSTOLIC BLOOD PRESSURE: 128 MMHG | DIASTOLIC BLOOD PRESSURE: 75 MMHG | OXYGEN SATURATION: 95 % | TEMPERATURE: 98.42 F | HEART RATE: 56 BPM | RESPIRATION RATE: 18 BRPM

## 2025-05-26 VITALS
DIASTOLIC BLOOD PRESSURE: 75 MMHG | OXYGEN SATURATION: 94 % | SYSTOLIC BLOOD PRESSURE: 111 MMHG | HEART RATE: 69 BPM | RESPIRATION RATE: 20 BRPM | TEMPERATURE: 98.2 F

## 2025-05-26 VITALS — OXYGEN SATURATION: 96 % | HEART RATE: 66 BPM | RESPIRATION RATE: 20 BRPM

## 2025-05-26 VITALS
DIASTOLIC BLOOD PRESSURE: 59 MMHG | RESPIRATION RATE: 16 BRPM | HEART RATE: 61 BPM | TEMPERATURE: 97.88 F | SYSTOLIC BLOOD PRESSURE: 112 MMHG | OXYGEN SATURATION: 96 %

## 2025-05-26 VITALS — OXYGEN SATURATION: 93 %

## 2025-05-26 VITALS — HEART RATE: 47 BPM | RESPIRATION RATE: 18 BRPM | OXYGEN SATURATION: 94 %

## 2025-05-26 VITALS — RESPIRATION RATE: 20 BRPM | HEART RATE: 54 BPM

## 2025-05-26 VITALS — SYSTOLIC BLOOD PRESSURE: 111 MMHG | DIASTOLIC BLOOD PRESSURE: 75 MMHG | HEART RATE: 69 BPM

## 2025-05-26 VITALS — HEART RATE: 68 BPM

## 2025-05-26 LAB
ANION GAP SERPL CALC-SCNC: 9 MMOL/L (ref 5–15)
BUN SERPL-MCNC: 28 MG/DL (ref 4–19)
BUN/CREAT SERPL: 26.4 RATIO (ref 10–20)
CALCIUM SERPL-MCNC: 9.3 MG/DL (ref 7.6–11)
CHLORIDE: 92 MMOL/L (ref 98–108)
CO2 BLDCV-SCNC: 35.9 MMOL/L (ref 21–32)
DIFFERENTIAL INDICATED: (no result)
ERYTHROCYTE [DISTWIDTH] IN BLOOD: 16.2 % (ref 11.6–14.6)
ESTIMATED CREATININE CLEARANCE: 80.55 ML/MIN (ref 50–250)
GLUCOSE SERPL-MCNC: 134 MG/DL (ref 70–99)
HCT VFR BLD AUTO: 32.7 % (ref 40–54)
HGB BLD-MCNC: 9.8 G/DL (ref 13–16.5)
MAGNESIUM SPEC-MCNC: 2.2 MG/DL (ref 1.5–2.2)
MCV RBC: 90.6 FL (ref 80–94)
MEAN PLATELET VOL.: 10.7 FL (ref 6.2–12)
NRBC FLAGGED BY ANALYZER: 0 % (ref 0–5)
PLATELET # BLD: 185 K/MM3 (ref 150–450)
POSITIVE DIFFERENTIAL: YES
POTASSIUM SPEC-MCNC: 3.8 MMOL/L (ref 3.3–5.1)
RBC # BLD AUTO: 3.61 M/MM3 (ref 4.6–6.2)

## 2025-05-26 RX ADMIN — TOLTERODINE TARTRATE 4 MG: 4 CAPSULE, EXTENDED RELEASE ORAL at 09:04

## 2025-05-26 RX ADMIN — SODIUM CHLORIDE, PRESERVATIVE FREE 0 ML: 5 INJECTION INTRAVENOUS at 04:57

## 2025-05-26 RX ADMIN — Medication 50 MCG: at 09:04

## 2025-05-26 RX ADMIN — SODIUM CHLORIDE, PRESERVATIVE FREE 0 ML: 5 INJECTION INTRAVENOUS at 15:37

## 2025-05-27 VITALS — OXYGEN SATURATION: 87 %

## 2025-05-27 VITALS
SYSTOLIC BLOOD PRESSURE: 118 MMHG | OXYGEN SATURATION: 96 % | TEMPERATURE: 97.88 F | HEART RATE: 60 BPM | RESPIRATION RATE: 19 BRPM | DIASTOLIC BLOOD PRESSURE: 66 MMHG

## 2025-05-27 VITALS
SYSTOLIC BLOOD PRESSURE: 111 MMHG | DIASTOLIC BLOOD PRESSURE: 66 MMHG | RESPIRATION RATE: 19 BRPM | TEMPERATURE: 98.6 F | HEART RATE: 64 BPM | OXYGEN SATURATION: 93 %

## 2025-05-27 VITALS
OXYGEN SATURATION: 94 % | HEART RATE: 87 BPM | SYSTOLIC BLOOD PRESSURE: 109 MMHG | DIASTOLIC BLOOD PRESSURE: 71 MMHG | RESPIRATION RATE: 18 BRPM | TEMPERATURE: 97 F

## 2025-05-27 VITALS — RESPIRATION RATE: 20 BRPM | HEART RATE: 78 BPM

## 2025-05-27 VITALS — RESPIRATION RATE: 20 BRPM | HEART RATE: 60 BPM

## 2025-05-27 VITALS
SYSTOLIC BLOOD PRESSURE: 104 MMHG | DIASTOLIC BLOOD PRESSURE: 63 MMHG | TEMPERATURE: 98.24 F | RESPIRATION RATE: 17 BRPM | OXYGEN SATURATION: 90 % | HEART RATE: 83 BPM

## 2025-05-27 VITALS
OXYGEN SATURATION: 93 % | SYSTOLIC BLOOD PRESSURE: 100 MMHG | RESPIRATION RATE: 18 BRPM | TEMPERATURE: 98.8 F | DIASTOLIC BLOOD PRESSURE: 61 MMHG | HEART RATE: 61 BPM

## 2025-05-27 VITALS — HEART RATE: 55 BPM | RESPIRATION RATE: 20 BRPM

## 2025-05-27 VITALS — RESPIRATION RATE: 18 BRPM | HEART RATE: 67 BPM

## 2025-05-27 VITALS — OXYGEN SATURATION: 100 % | HEART RATE: 63 BPM | RESPIRATION RATE: 20 BRPM

## 2025-05-27 VITALS — RESPIRATION RATE: 20 BRPM | HEART RATE: 54 BPM

## 2025-05-27 VITALS — DIASTOLIC BLOOD PRESSURE: 66 MMHG | SYSTOLIC BLOOD PRESSURE: 118 MMHG | HEART RATE: 60 BPM

## 2025-05-27 VITALS — HEART RATE: 82 BPM

## 2025-05-27 VITALS — OXYGEN SATURATION: 92 % | HEART RATE: 60 BPM | RESPIRATION RATE: 18 BRPM

## 2025-05-27 VITALS — OXYGEN SATURATION: 94 %

## 2025-05-27 VITALS — OXYGEN SATURATION: 91 %

## 2025-05-27 RX ADMIN — TOLTERODINE TARTRATE 4 MG: 4 CAPSULE, EXTENDED RELEASE ORAL at 08:56

## 2025-05-27 RX ADMIN — Medication 50 MCG: at 08:56

## 2025-05-27 RX ADMIN — SODIUM CHLORIDE, PRESERVATIVE FREE 0 ML: 5 INJECTION INTRAVENOUS at 20:49

## 2025-05-28 ENCOUNTER — APPOINTMENT (OUTPATIENT)
Dept: UROLOGY | Facility: CLINIC | Age: 80
End: 2025-05-28
Payer: MEDICARE

## 2025-05-28 VITALS — HEART RATE: 77 BPM

## 2025-05-28 VITALS — OXYGEN SATURATION: 88 %

## 2025-05-28 VITALS
RESPIRATION RATE: 18 BRPM | HEART RATE: 52 BPM | DIASTOLIC BLOOD PRESSURE: 57 MMHG | TEMPERATURE: 97.9 F | SYSTOLIC BLOOD PRESSURE: 101 MMHG | OXYGEN SATURATION: 97 %

## 2025-05-28 VITALS — RESPIRATION RATE: 20 BRPM | OXYGEN SATURATION: 93 % | HEART RATE: 60 BPM

## 2025-05-28 VITALS
DIASTOLIC BLOOD PRESSURE: 77 MMHG | TEMPERATURE: 98.3 F | SYSTOLIC BLOOD PRESSURE: 122 MMHG | RESPIRATION RATE: 17 BRPM | OXYGEN SATURATION: 92 % | HEART RATE: 65 BPM

## 2025-05-28 VITALS
DIASTOLIC BLOOD PRESSURE: 71 MMHG | HEART RATE: 56 BPM | SYSTOLIC BLOOD PRESSURE: 109 MMHG | OXYGEN SATURATION: 96 % | TEMPERATURE: 97.7 F | RESPIRATION RATE: 17 BRPM

## 2025-05-28 VITALS
RESPIRATION RATE: 18 BRPM | SYSTOLIC BLOOD PRESSURE: 108 MMHG | TEMPERATURE: 98.3 F | DIASTOLIC BLOOD PRESSURE: 69 MMHG | HEART RATE: 77 BPM | OXYGEN SATURATION: 98 %

## 2025-05-28 VITALS — HEART RATE: 58 BPM | RESPIRATION RATE: 22 BRPM

## 2025-05-28 VITALS
DIASTOLIC BLOOD PRESSURE: 74 MMHG | SYSTOLIC BLOOD PRESSURE: 130 MMHG | TEMPERATURE: 97.2 F | HEART RATE: 53 BPM | RESPIRATION RATE: 18 BRPM | OXYGEN SATURATION: 98 %

## 2025-05-28 VITALS — HEART RATE: 65 BPM

## 2025-05-28 VITALS — RESPIRATION RATE: 17 BRPM | OXYGEN SATURATION: 91 % | HEART RATE: 50 BPM

## 2025-05-28 VITALS — OXYGEN SATURATION: 91 % | RESPIRATION RATE: 18 BRPM | HEART RATE: 55 BPM

## 2025-05-28 VITALS — HEART RATE: 61 BPM

## 2025-05-28 VITALS — HEART RATE: 54 BPM | RESPIRATION RATE: 20 BRPM

## 2025-05-28 VITALS — BODY MASS INDEX: 35.2 KG/M2

## 2025-05-28 LAB
ANION GAP SERPL CALC-SCNC: 8 MMOL/L (ref 5–15)
BUN SERPL-MCNC: 32 MG/DL (ref 4–19)
BUN/CREAT SERPL: 28.2 RATIO (ref 10–20)
CALCIUM SERPL-MCNC: 8.9 MG/DL (ref 7.6–11)
CHLORIDE: 94 MMOL/L (ref 98–108)
CO2 BLDCV-SCNC: 35.1 MMOL/L (ref 21–32)
DEPRECATED RDW RBC: 55.8 FL (ref 35.1–43.9)
ERYTHROCYTE [DISTWIDTH] IN BLOOD: 16.3 % (ref 11.6–14.6)
ESTIMATED CREATININE CLEARANCE: 76.15 ML/MIN (ref 50–250)
GLUCOSE SERPL-MCNC: 110 MG/DL (ref 70–99)
HCT VFR BLD AUTO: 34.1 % (ref 40–54)
HGB BLD-MCNC: 10.2 G/DL (ref 13–16.5)
MCV RBC: 92.2 FL (ref 80–94)
MEAN CORP HGB CONC: 29.9 G/DL (ref 32–36)
MEAN PLATELET VOL.: 10.3 FL (ref 6.2–12)
PLATELET # BLD: 177 K/MM3 (ref 150–450)
POTASSIUM SPEC-MCNC: 3.7 MMOL/L (ref 3.3–5.1)
WBC # BLD AUTO: 9.2 K/MM3 (ref 4.4–11)

## 2025-05-28 RX ADMIN — Medication 50 MCG: at 09:30

## 2025-05-28 RX ADMIN — SODIUM CHLORIDE, PRESERVATIVE FREE 0 ML: 5 INJECTION INTRAVENOUS at 21:38

## 2025-05-28 RX ADMIN — TOLTERODINE TARTRATE 4 MG: 4 CAPSULE, EXTENDED RELEASE ORAL at 09:29

## 2025-05-29 VITALS
TEMPERATURE: 97.2 F | OXYGEN SATURATION: 100 % | SYSTOLIC BLOOD PRESSURE: 127 MMHG | DIASTOLIC BLOOD PRESSURE: 66 MMHG | HEART RATE: 45 BPM | RESPIRATION RATE: 18 BRPM

## 2025-05-29 VITALS
RESPIRATION RATE: 18 BRPM | DIASTOLIC BLOOD PRESSURE: 60 MMHG | HEART RATE: 48 BPM | OXYGEN SATURATION: 93 % | SYSTOLIC BLOOD PRESSURE: 106 MMHG | TEMPERATURE: 98.2 F

## 2025-05-29 VITALS — HEART RATE: 67 BPM | RESPIRATION RATE: 20 BRPM | OXYGEN SATURATION: 92 %

## 2025-05-29 VITALS
DIASTOLIC BLOOD PRESSURE: 60 MMHG | TEMPERATURE: 98.2 F | RESPIRATION RATE: 18 BRPM | OXYGEN SATURATION: 93 % | SYSTOLIC BLOOD PRESSURE: 106 MMHG | HEART RATE: 48 BPM

## 2025-05-29 VITALS — RESPIRATION RATE: 20 BRPM | HEART RATE: 69 BPM

## 2025-05-29 VITALS
HEART RATE: 70 BPM | OXYGEN SATURATION: 94 % | SYSTOLIC BLOOD PRESSURE: 102 MMHG | TEMPERATURE: 97.4 F | DIASTOLIC BLOOD PRESSURE: 62 MMHG | RESPIRATION RATE: 18 BRPM

## 2025-05-29 VITALS — HEART RATE: 48 BPM

## 2025-05-29 VITALS — RESPIRATION RATE: 18 BRPM | HEART RATE: 71 BPM

## 2025-05-29 VITALS — DIASTOLIC BLOOD PRESSURE: 60 MMHG | HEART RATE: 48 BPM | SYSTOLIC BLOOD PRESSURE: 106 MMHG

## 2025-05-29 VITALS — BODY MASS INDEX: 34.8 KG/M2

## 2025-05-29 VITALS
TEMPERATURE: 97.34 F | SYSTOLIC BLOOD PRESSURE: 102 MMHG | DIASTOLIC BLOOD PRESSURE: 62 MMHG | OXYGEN SATURATION: 94 % | RESPIRATION RATE: 18 BRPM | HEART RATE: 70 BPM

## 2025-05-29 LAB — PROTHROMBIN TIME (PROTIME)PT.: 18.3 SECONDS (ref 11.7–14.9)

## 2025-05-29 RX ADMIN — Medication 50 MCG: at 09:40

## 2025-05-29 RX ADMIN — TOLTERODINE TARTRATE 4 MG: 4 CAPSULE, EXTENDED RELEASE ORAL at 09:41

## 2025-06-18 ENCOUNTER — APPOINTMENT (OUTPATIENT)
Dept: UROLOGY | Facility: CLINIC | Age: 80
End: 2025-06-18
Payer: MEDICARE

## 2025-06-18 VITALS
SYSTOLIC BLOOD PRESSURE: 120 MMHG | BODY MASS INDEX: 35.42 KG/M2 | DIASTOLIC BLOOD PRESSURE: 60 MMHG | HEIGHT: 74 IN | WEIGHT: 276 LBS | HEART RATE: 60 BPM

## 2025-06-18 DIAGNOSIS — N13.8 BENIGN PROSTATIC HYPERPLASIA WITH URINARY OBSTRUCTION: ICD-10-CM

## 2025-06-18 DIAGNOSIS — C61 PROSTATE CANCER (MULTI): Primary | ICD-10-CM

## 2025-06-18 DIAGNOSIS — N32.89 BLADDER SPASM: ICD-10-CM

## 2025-06-18 DIAGNOSIS — N40.1 BENIGN PROSTATIC HYPERPLASIA WITH URINARY OBSTRUCTION: ICD-10-CM

## 2025-06-18 PROCEDURE — 3074F SYST BP LT 130 MM HG: CPT | Performed by: NURSE PRACTITIONER

## 2025-06-18 PROCEDURE — 3078F DIAST BP <80 MM HG: CPT | Performed by: NURSE PRACTITIONER

## 2025-06-18 PROCEDURE — 4004F PT TOBACCO SCREEN RCVD TLK: CPT | Performed by: NURSE PRACTITIONER

## 2025-06-18 PROCEDURE — 1160F RVW MEDS BY RX/DR IN RCRD: CPT | Performed by: NURSE PRACTITIONER

## 2025-06-18 PROCEDURE — 99213 OFFICE O/P EST LOW 20 MIN: CPT | Performed by: NURSE PRACTITIONER

## 2025-06-18 PROCEDURE — 1159F MED LIST DOCD IN RCRD: CPT | Performed by: NURSE PRACTITIONER

## 2025-06-18 NOTE — PROGRESS NOTES
[facility]  Outpatient Clinic Note        Subjective   Patient ID: Levi Lambert is a 79 y.o. male who presents for Bladder spasm, Prostate Cancer.      History of Present Illness  Levi is a 79 y.o. male who presents for follow-up on history of prostate cancer.  Most recent PSA 25.85 on 6/16/25, 1.86 on 4/18/2022, 5.52 on 10/7/2021, 1.27 on 3/4/2021.  Patient is receiving Lupron injections as monotherapy.  His last Lupron was on 11/1/2021.  Chronic LUTS symptoms are mild and stable.  Does admit to some urgency and frequency.  Positive incontinence, unchanged. Admits to bladder spasms on oxybutynin. Occasional hesitancy.  Denies dysuria or hematuria.  Nocturia 2-3 times.  Continues on Lasix which worsens his symptoms of incontinence.  He does have a long-term indwelling Tamayo catheter which home health changes once per month.  His last UTI was about a month and a half ago per daughter who was present during exam.  BUN/creatinine 34/1.1 respectively with EGFR of 78 on 5/30/2025.  Last INR 2.7 on 6/16/2025.    ON COUMADIN.    Past Medical & Surgical History  Medical History[1]   Surgical History[2]     Review of Systems:    A 12 point review of systems was completed and otherwise negative unless noted in the HPI    Physical Exam                                                                                                                      General: Well developed, well nourished, alert and cooperative, appears in no acute distress  Eyes: Non-injected conjunctiva, sclera clear, no proptosis  Cardiac: Extremities are warm and well perfused. No edema, cyanosis or pallor.   Lungs: Breathing is easy, non-labored. Speaking in clear and complete sentences. Normal diaphragmatic movement.  Abdomen: soft NT with BSP, no CVA tenderness. Tamayo cath intact draining clear yellow urine.   Genitourinary:    Kidneys: non palpable bilat   Bladder: non tender, non distended   Scrotum: no mass palpated   Epididymis: non  tender; no mass palpated bilat   Testicles: no mass   Urethra: no discharge   Penis: WNL, no lesions   Prostate: 3+, smooth, firm, nontender without nodules    Rectal: normal tone   MSK: Ambulatory with steady gait, unassisted  Neuro: alert and oriented to person, place and time  Psych: Demonstrates good judgement and reason, without abnormal affect or abnormal behaviors.  Skin: no obvious lesions, no rashes    Objective     LABS  No results found for this visit on 06/18/25.     Imaging        Problem List Items Addressed This Visit       Bladder spasm    Current Assessment & Plan   Stable. Has been on oxybutynin for 'a long time'. Discussed alternative options. Pt declines at this time. Will continue to monitor.          Prostate cancer (Multi) - Primary    Current Assessment & Plan   PSA values reviewed.  Has been on Lupron for monotherapy.  Wants Lupron injection today if possible. Pros and cons of Lupron discussed.      Lupron 45 mg IM given today.    PSA in 6 months         Relevant Orders    PSA    Follow Up In Urology    Benign prostatic hyperplasia with urinary obstruction    Current Assessment & Plan   Stable.  Continue with Tamayo cath and catheter changes per home health monthly. SP not good option given body habitus.  Monitor for s/s infection. To be changed 6/22/25. Discussed ways to reduce risk for infection.    Encouraged fluid intake. Discussed options for treatment for LUTS.   I educated the patient on dietary and behavioral modifications pertinent to the patient's complaints. I recommended to avoid caffeine, alcohol, spicy and acidic oral intake and to regulate fluid intake and voiding (timed voiding). I stressed the importance of avoiding constipation and recommended stool softeners unless diarrhea present. I explained the role of pelvic floor therapy in reducing lower urinary tract symptoms.                        GASTON Miguel-CNP 06/19/25 2:41 PM          [1] History reviewed. No pertinent  past medical history.  [2]   Past Surgical History:  Procedure Laterality Date    OTHER SURGICAL HISTORY  09/18/2019    Hip replacement    OTHER SURGICAL HISTORY  09/18/2019    Colectomy    OTHER SURGICAL HISTORY  10/07/2019    Cervical vertebral fusion    OTHER SURGICAL HISTORY  10/07/2019    Cardioverter defibrillator insertion

## 2025-06-19 PROBLEM — N40.1 BENIGN PROSTATIC HYPERPLASIA WITH URINARY OBSTRUCTION: Status: ACTIVE | Noted: 2025-06-19

## 2025-06-19 PROBLEM — N13.8 BENIGN PROSTATIC HYPERPLASIA WITH URINARY OBSTRUCTION: Status: ACTIVE | Noted: 2025-06-19

## 2025-06-19 NOTE — ASSESSMENT & PLAN NOTE
Stable.  Continue with Tamayo cath and catheter changes per home health monthly. SP not good option given body habitus.  Monitor for s/s infection. To be changed 6/22/25. Discussed ways to reduce risk for infection.    Encouraged fluid intake. Discussed options for treatment for LUTS.   I educated the patient on dietary and behavioral modifications pertinent to the patient's complaints. I recommended to avoid caffeine, alcohol, spicy and acidic oral intake and to regulate fluid intake and voiding (timed voiding). I stressed the importance of avoiding constipation and recommended stool softeners unless diarrhea present. I explained the role of pelvic floor therapy in reducing lower urinary tract symptoms.

## 2025-06-19 NOTE — ASSESSMENT & PLAN NOTE
PSA values reviewed.  Has been on Lupron for monotherapy.  Wants Lupron injection today if possible. Pros and cons of Lupron discussed.      Lupron 45 mg IM given today.    PSA in 6 months

## 2025-06-19 NOTE — ASSESSMENT & PLAN NOTE
Stable. Has been on oxybutynin for 'a long time'. Discussed alternative options. Pt declines at this time. Will continue to monitor.

## 2025-12-12 ENCOUNTER — APPOINTMENT (OUTPATIENT)
Dept: UROLOGY | Facility: CLINIC | Age: 80
End: 2025-12-12
Payer: MEDICARE